# Patient Record
Sex: MALE | Race: AMERICAN INDIAN OR ALASKA NATIVE | NOT HISPANIC OR LATINO | Employment: FULL TIME | URBAN - NONMETROPOLITAN AREA
[De-identification: names, ages, dates, MRNs, and addresses within clinical notes are randomized per-mention and may not be internally consistent; named-entity substitution may affect disease eponyms.]

---

## 2020-08-25 ENCOUNTER — HOSPITAL ENCOUNTER (EMERGENCY)
Facility: OTHER | Age: 25
Discharge: PSYCHIATRIC OR SUBSTANCE USE FACILITY | End: 2020-08-26
Attending: STUDENT IN AN ORGANIZED HEALTH CARE EDUCATION/TRAINING PROGRAM | Admitting: STUDENT IN AN ORGANIZED HEALTH CARE EDUCATION/TRAINING PROGRAM
Payer: COMMERCIAL

## 2020-08-25 DIAGNOSIS — R44.0 AUDITORY HALLUCINATION: ICD-10-CM

## 2020-08-25 DIAGNOSIS — R44.1 VISUAL HALLUCINATION: ICD-10-CM

## 2020-08-25 DIAGNOSIS — R45.851 SUICIDAL IDEATION: ICD-10-CM

## 2020-08-25 DIAGNOSIS — F41.9 ANXIETY: ICD-10-CM

## 2020-08-25 DIAGNOSIS — R07.89 ATYPICAL CHEST PAIN: ICD-10-CM

## 2020-08-25 LAB
BASOPHILS # BLD AUTO: 0.1 10E9/L (ref 0–0.2)
BASOPHILS NFR BLD AUTO: 0.5 %
DIFFERENTIAL METHOD BLD: NORMAL
EOSINOPHIL # BLD AUTO: 0.1 10E9/L (ref 0–0.7)
EOSINOPHIL NFR BLD AUTO: 1.5 %
ERYTHROCYTE [DISTWIDTH] IN BLOOD BY AUTOMATED COUNT: 12.7 % (ref 10–15)
HCT VFR BLD AUTO: 44.8 % (ref 40–53)
HGB BLD-MCNC: 14.9 G/DL (ref 13.3–17.7)
IMM GRANULOCYTES # BLD: 0 10E9/L (ref 0–0.4)
IMM GRANULOCYTES NFR BLD: 0.3 %
LYMPHOCYTES # BLD AUTO: 1.6 10E9/L (ref 0.8–5.3)
LYMPHOCYTES NFR BLD AUTO: 17.9 %
MCH RBC QN AUTO: 29.2 PG (ref 26.5–33)
MCHC RBC AUTO-ENTMCNC: 33.3 G/DL (ref 31.5–36.5)
MCV RBC AUTO: 88 FL (ref 78–100)
MONOCYTES # BLD AUTO: 0.8 10E9/L (ref 0–1.3)
MONOCYTES NFR BLD AUTO: 9.2 %
NEUTROPHILS # BLD AUTO: 6.4 10E9/L (ref 1.6–8.3)
NEUTROPHILS NFR BLD AUTO: 70.6 %
PLATELET # BLD AUTO: 359 10E9/L (ref 150–450)
RBC # BLD AUTO: 5.1 10E12/L (ref 4.4–5.9)
WBC # BLD AUTO: 9.1 10E9/L (ref 4–11)

## 2020-08-25 PROCEDURE — 80048 BASIC METABOLIC PNL TOTAL CA: CPT | Performed by: STUDENT IN AN ORGANIZED HEALTH CARE EDUCATION/TRAINING PROGRAM

## 2020-08-25 PROCEDURE — 81003 URINALYSIS AUTO W/O SCOPE: CPT | Mod: XU | Performed by: STUDENT IN AN ORGANIZED HEALTH CARE EDUCATION/TRAINING PROGRAM

## 2020-08-25 PROCEDURE — 93010 ELECTROCARDIOGRAM REPORT: CPT | Performed by: INTERNAL MEDICINE

## 2020-08-25 PROCEDURE — 84443 ASSAY THYROID STIM HORMONE: CPT | Performed by: STUDENT IN AN ORGANIZED HEALTH CARE EDUCATION/TRAINING PROGRAM

## 2020-08-25 PROCEDURE — 80307 DRUG TEST PRSMV CHEM ANLYZR: CPT | Performed by: STUDENT IN AN ORGANIZED HEALTH CARE EDUCATION/TRAINING PROGRAM

## 2020-08-25 PROCEDURE — 36415 COLL VENOUS BLD VENIPUNCTURE: CPT | Performed by: STUDENT IN AN ORGANIZED HEALTH CARE EDUCATION/TRAINING PROGRAM

## 2020-08-25 PROCEDURE — 84484 ASSAY OF TROPONIN QUANT: CPT | Performed by: STUDENT IN AN ORGANIZED HEALTH CARE EDUCATION/TRAINING PROGRAM

## 2020-08-25 PROCEDURE — C9803 HOPD COVID-19 SPEC COLLECT: HCPCS | Performed by: STUDENT IN AN ORGANIZED HEALTH CARE EDUCATION/TRAINING PROGRAM

## 2020-08-25 PROCEDURE — 99285 EMERGENCY DEPT VISIT HI MDM: CPT | Mod: 25 | Performed by: STUDENT IN AN ORGANIZED HEALTH CARE EDUCATION/TRAINING PROGRAM

## 2020-08-25 PROCEDURE — 99285 EMERGENCY DEPT VISIT HI MDM: CPT | Mod: Z6 | Performed by: STUDENT IN AN ORGANIZED HEALTH CARE EDUCATION/TRAINING PROGRAM

## 2020-08-25 PROCEDURE — 85025 COMPLETE CBC W/AUTO DIFF WBC: CPT | Performed by: STUDENT IN AN ORGANIZED HEALTH CARE EDUCATION/TRAINING PROGRAM

## 2020-08-25 PROCEDURE — 93005 ELECTROCARDIOGRAM TRACING: CPT | Performed by: STUDENT IN AN ORGANIZED HEALTH CARE EDUCATION/TRAINING PROGRAM

## 2020-08-25 ASSESSMENT — MIFFLIN-ST. JEOR: SCORE: 2869.6

## 2020-08-26 ENCOUNTER — TELEPHONE (OUTPATIENT)
Dept: BEHAVIORAL HEALTH | Facility: CLINIC | Age: 25
End: 2020-08-26

## 2020-08-26 ENCOUNTER — HOSPITAL ENCOUNTER (INPATIENT)
Facility: HOSPITAL | Age: 25
LOS: 2 days | Discharge: HOME | End: 2020-08-28
Attending: PSYCHIATRY & NEUROLOGY | Admitting: PSYCHIATRY & NEUROLOGY
Payer: COMMERCIAL

## 2020-08-26 ENCOUNTER — APPOINTMENT (OUTPATIENT)
Dept: GENERAL RADIOLOGY | Facility: OTHER | Age: 25
End: 2020-08-26
Attending: STUDENT IN AN ORGANIZED HEALTH CARE EDUCATION/TRAINING PROGRAM
Payer: COMMERCIAL

## 2020-08-26 VITALS
OXYGEN SATURATION: 99 % | SYSTOLIC BLOOD PRESSURE: 130 MMHG | RESPIRATION RATE: 20 BRPM | WEIGHT: 315 LBS | DIASTOLIC BLOOD PRESSURE: 80 MMHG | BODY MASS INDEX: 36.45 KG/M2 | TEMPERATURE: 97.9 F | HEIGHT: 78 IN | HEART RATE: 73 BPM

## 2020-08-26 DIAGNOSIS — F33.1 MODERATE EPISODE OF RECURRENT MAJOR DEPRESSIVE DISORDER (H): ICD-10-CM

## 2020-08-26 DIAGNOSIS — G47.00 INSOMNIA, UNSPECIFIED TYPE: ICD-10-CM

## 2020-08-26 DIAGNOSIS — R52 PAIN: ICD-10-CM

## 2020-08-26 DIAGNOSIS — Z72.0 TOBACCO ABUSE: Primary | ICD-10-CM

## 2020-08-26 DIAGNOSIS — F32.1 CURRENT MODERATE EPISODE OF MAJOR DEPRESSIVE DISORDER WITHOUT PRIOR EPISODE (H): ICD-10-CM

## 2020-08-26 DIAGNOSIS — F41.9 ANXIETY: ICD-10-CM

## 2020-08-26 PROBLEM — F32.9 MAJOR DEPRESSIVE DISORDER: Status: ACTIVE | Noted: 2020-08-26

## 2020-08-26 LAB
ALBUMIN SERPL-MCNC: 3.3 G/DL (ref 3.4–5)
ALBUMIN UR-MCNC: NEGATIVE MG/DL
ALP SERPL-CCNC: 82 U/L (ref 40–150)
ALT SERPL W P-5'-P-CCNC: 27 U/L (ref 0–70)
AMPHETAMINES UR QL SCN: NOT DETECTED
ANION GAP SERPL CALCULATED.3IONS-SCNC: 7 MMOL/L (ref 3–14)
APPEARANCE UR: CLEAR
AST SERPL W P-5'-P-CCNC: 13 U/L (ref 0–45)
BARBITURATES UR QL: NOT DETECTED
BENZODIAZ UR QL: NOT DETECTED
BILIRUB DIRECT SERPL-MCNC: 0.1 MG/DL (ref 0–0.2)
BILIRUB SERPL-MCNC: 0.6 MG/DL (ref 0.2–1.3)
BILIRUB UR QL STRIP: NEGATIVE
BUN SERPL-MCNC: 10 MG/DL (ref 7–25)
BUPRENORPHINE UR QL: NOT DETECTED NG/ML
CALCIUM SERPL-MCNC: 8.8 MG/DL (ref 8.6–10.3)
CANNABINOIDS UR QL: NOT DETECTED NG/ML
CHLORIDE SERPL-SCNC: 103 MMOL/L (ref 98–107)
CO2 SERPL-SCNC: 28 MMOL/L (ref 21–31)
COCAINE UR QL: NOT DETECTED
COLOR UR AUTO: NORMAL
CREAT SERPL-MCNC: 0.57 MG/DL (ref 0.7–1.3)
D-METHAMPHET UR QL: NOT DETECTED NG/ML
GFR SERPL CREATININE-BSD FRML MDRD: >90 ML/MIN/{1.73_M2}
GGT SERPL-CCNC: 45 U/L (ref 0–75)
GLUCOSE SERPL-MCNC: 107 MG/DL (ref 70–105)
GLUCOSE UR STRIP-MCNC: NEGATIVE MG/DL
HGB UR QL STRIP: NEGATIVE
KETONES UR STRIP-MCNC: NEGATIVE MG/DL
LABORATORY COMMENT REPORT: NORMAL
LEUKOCYTE ESTERASE UR QL STRIP: NEGATIVE
METHADONE UR QL SCN: NOT DETECTED
NITRATE UR QL: NEGATIVE
OPIATES UR QL SCN: NOT DETECTED
OXYCODONE UR QL: NOT DETECTED NG/ML
PCP UR QL SCN: NOT DETECTED
PH UR STRIP: 6.5 PH (ref 5–7)
POTASSIUM SERPL-SCNC: 3.6 MMOL/L (ref 3.5–5.1)
PROPOXYPH UR QL: NOT DETECTED NG/ML
PROT SERPL-MCNC: 6.9 G/DL (ref 6.8–8.8)
SARS-COV-2 RNA SPEC QL NAA+PROBE: NEGATIVE
SARS-COV-2 RNA SPEC QL NAA+PROBE: NORMAL
SODIUM SERPL-SCNC: 138 MMOL/L (ref 134–144)
SOURCE: NORMAL
SP GR UR STRIP: 1.02 (ref 1–1.03)
SPECIMEN SOURCE: NORMAL
SPECIMEN SOURCE: NORMAL
TRICYCLICS UR QL SCN: NOT DETECTED NG/ML
TROPONIN I SERPL-MCNC: <2.3 PG/ML
TSH SERPL DL<=0.05 MIU/L-ACNC: 3.19 IU/ML (ref 0.34–5.6)
UROBILINOGEN UR STRIP-MCNC: NORMAL MG/DL (ref 0–2)

## 2020-08-26 PROCEDURE — 12400000 ZZH R&B MH

## 2020-08-26 PROCEDURE — 99223 1ST HOSP IP/OBS HIGH 75: CPT | Performed by: NURSE PRACTITIONER

## 2020-08-26 PROCEDURE — 87635 SARS-COV-2 COVID-19 AMP PRB: CPT | Performed by: STUDENT IN AN ORGANIZED HEALTH CARE EDUCATION/TRAINING PROGRAM

## 2020-08-26 PROCEDURE — 71045 X-RAY EXAM CHEST 1 VIEW: CPT

## 2020-08-26 PROCEDURE — 80076 HEPATIC FUNCTION PANEL: CPT | Performed by: NURSE PRACTITIONER

## 2020-08-26 PROCEDURE — 36415 COLL VENOUS BLD VENIPUNCTURE: CPT | Performed by: NURSE PRACTITIONER

## 2020-08-26 PROCEDURE — 82977 ASSAY OF GGT: CPT | Performed by: NURSE PRACTITIONER

## 2020-08-26 RX ORDER — NICOTINE 21 MG/24HR
1 PATCH, TRANSDERMAL 24 HOURS TRANSDERMAL DAILY
Status: DISCONTINUED | OUTPATIENT
Start: 2020-08-26 | End: 2020-08-28 | Stop reason: HOSPADM

## 2020-08-26 RX ORDER — SERTRALINE HYDROCHLORIDE 25 MG/1
25 TABLET, FILM COATED ORAL DAILY
Status: ON HOLD | COMMUNITY
Start: 2020-06-15 | End: 2020-08-28

## 2020-08-26 RX ORDER — TRAZODONE HYDROCHLORIDE 50 MG/1
50 TABLET, FILM COATED ORAL
Status: DISCONTINUED | OUTPATIENT
Start: 2020-08-26 | End: 2020-08-28 | Stop reason: HOSPADM

## 2020-08-26 RX ORDER — HYDROXYZINE HYDROCHLORIDE 10 MG/1
30-50 TABLET, FILM COATED ORAL EVERY 4 HOURS PRN
Status: DISCONTINUED | OUTPATIENT
Start: 2020-08-26 | End: 2020-08-28 | Stop reason: HOSPADM

## 2020-08-26 RX ORDER — ACETAMINOPHEN 325 MG/1
650 TABLET ORAL EVERY 4 HOURS PRN
Status: DISCONTINUED | OUTPATIENT
Start: 2020-08-26 | End: 2020-08-28 | Stop reason: HOSPADM

## 2020-08-26 ASSESSMENT — ACTIVITIES OF DAILY LIVING (ADL)
DRESS: SCRUBS (BEHAVIORAL HEALTH);INDEPENDENT
FALL_HISTORY_WITHIN_LAST_SIX_MONTHS: NO
HYGIENE/GROOMING: INDEPENDENT;PROMPTS
TOILETING: 0-->INDEPENDENT
DRESS: 0-->INDEPENDENT
LAUNDRY: UNABLE TO COMPLETE
RETIRED_COMMUNICATION: 0-->UNDERSTANDS/COMMUNICATES WITHOUT DIFFICULTY
BATHING: 0-->INDEPENDENT
AMBULATION: 0-->INDEPENDENT
COGNITION: 0 - NO COGNITION ISSUES REPORTED
TRANSFERRING: 0-->INDEPENDENT
SWALLOWING: 0-->SWALLOWS FOODS/LIQUIDS WITHOUT DIFFICULTY
ORAL_HYGIENE: INDEPENDENT;PROMPTS
RETIRED_EATING: 0-->INDEPENDENT

## 2020-08-26 ASSESSMENT — MIFFLIN-ST. JEOR: SCORE: 2828.32

## 2020-08-26 NOTE — PLAN OF CARE
"ADMISSION NOTE    Reason for admission SI.  Safety concerns Risk for self harm.  Risk for or history of violence none   Full skin assessment: Completed with no areas of concern.    Patient arrived on unit from [facility] accompanied by Security and  staff on 8/26/2020  0421 AM.   Status on arrival: Pleasant, calm and cooperative with staff.   /58   Pulse 70   Temp 98.8  F (37.1  C) (Tympanic)   Resp 18   Ht 1.93 m (6' 4\")   Wt (!) 173.7 kg (382 lb 14.4 oz)   SpO2 97%   BMI 46.61 kg/m    Patient given tour of unit and Welcome to  unit papers given to patient, wanding completed, belongings inventoried, and admission assessment completed.   Patient's legal status on arrival is Voluntary. Appropriate legal rights discussed with and copy given to patient. Patient Bill of Rights discussed with and copy given to patient.   Patient denies SI, HI, and thoughts of self harm and contracts for safety while on unit.      Flor Mabry RN  8/26/2020  5:46 AM      Pt arrived on unit, is pleasant and cooperative with staff.  Appears down, flat affect.  Stated he works at the [employer] near [place] and he likes it there.  He recently moved out on his own and states that he is very lonely, trying to make ends meet and he feels this has been the major contributory factor in his depression and anxiety.  Pt stated he has had past SA, did not elaborate stated it was when he was under age 19 except for the last attempt was approx. 3 months ago.  Pt denied pain or discomfort.  Did contract for safety while on the unit.      Face to face report will be communicated to oncoming RN.    Flor Mabry RN  8/26/2020  6:11 AM         Problem: Behavioral Health Plan of Care  Goal: Patient-Specific Goal (Individualization)  Description: Pt will attend at least 50% of groups.  Pt will sleep at least 6-8 hours per night.  Pt will be compliant with ordered medications and treatment team recommendations.    Outcome: No Change     "

## 2020-08-26 NOTE — PLAN OF CARE
The only med I could find was a sertraline 25 mg, increased to 50 mg after one week. I called the pharmacy it was sent to and it was never picked up, so it is likely he never started it. (Sent to [facility] [place] 6/15/20)    Let me know if there is anything else to look into and I will do so, otherwise this was the only maintenance medication I could find for this patient.     Zainab Sheets on 8/26/2020 at 3:44 PM

## 2020-08-26 NOTE — ED NOTES
Pt aware of order for DEC assessment to be preformed. Pt verbalized understanding of information.  Pt agrees to DEC assessment.

## 2020-08-26 NOTE — TELEPHONE ENCOUNTER
S:  CARLOS Peña called at 12:45a with 25y Male in [facility] ED with suicidal thoughts, anxiety, auditory and visual hallucinations.    B:  Pt reports increased anxiety and frequent suicidal thoughts without a current plan, but in past has thought of using knives. Pt states he has had 3 close attempts of suicide with most recent 3 months ago, but family support is what keeps him from carrying out his plan.  Pt reports his anxiety is worsening, and hears noises and sometimes his families voices.  Additionally, pt reports seeing black lines and shadows.  Pt reports he lives alone and works part time at the [employer] in [place] and feels he cannot keep self safe. Pt self reports anxiety and SI are worse day after drinking.   Pt states he did drink yesterday, but no alcohol today.  Pt has gone to [facility] in the past for care but does not like how they treat him there so came to [facility] where he feels people are nicer and more helpful.      Pt is very tearful in the ED.   Pt has no hx of MH and no providers.   UTOX clear, Covid test in pending.    A:  Vol    R:  Patient cleared and ready for behavioral bed placement: Yes   Provider paged for EMANUEL OCHOA/Zain at 1:14am & 1:49am.   Contacted provider at 1:49am and Presented for Zain/HI 5N and provider accepted.  Pt in que at 1:52a and charge nurse 5N called and given disposition.  [facility] ED Updated @ 1:55am

## 2020-08-26 NOTE — ED NOTES
Pt belongings removed from locker #1 to be sent with Pt on transfer. Pt did leave his keys in lock up with security for his mom to come and get them so she can care for his car and house.

## 2020-08-26 NOTE — PLAN OF CARE
"  Problem: Behavioral Health Plan of Care  Goal: Patient-Specific Goal (Individualization)  Description: Pt will attend at least 50% of groups.  Pt will sleep at least 6-8 hours per night.  Pt will be compliant with ordered medications and treatment team recommendations.    8/26/2020 1647 by Kasandra Huerta, RN  Outcome: No Change     End of shift report received form previous shift RN. Pt observed, in bed sleeping. At shift change, pt received phone call from family members wanting to talk with pt. Pt informed of this. Pt calm, cooperative, blunted/flat affect. \"i'm alright\" when asked about anxiety, depression. Denies SI.  1830- Encouraged to shower, wean out of MH-ICU, attend group pt responds by asking to leave and continues to lay in bed. Explained 12 hour intent form and process for leaving. Pt verbalized understanding and still wants to sign form.  1850- Brought form to pt, pt to wait until tomorrow to talk with the provider. Form not signed.    1945- Weans out of MH-ICU to take phone call. Remains common areas throughout snack time.  Returns to room to shower and comes back to open unit. Pleasant, calm, cooperative.   Face to face end of shift report communicated to oncoming RN.     Kasandra Huerta RN  8/26/2020  10:19 PM     PTA medications- Pt declines ever taking sertraline.     Problem: Suicide Risk  Goal: Absence of Self-Harm  Description: Pt will be free from self harm during hospital stay.  Pt will verbalize decrease depression and suicide Ideation by discharge.  Pt will verbalize suicide prevention plan by discharge.  8/26/2020 1647 by Kasandra Huerta, RN  Outcome: No Change   Denies SI. Demonstrates safe behaviors on unit.   "

## 2020-08-26 NOTE — LETTER
[facility]  [address] [place] [id number]  Phone: [phone]  Fax: [phone]    08/29/20    Ant JOEL Headbird  [address] [facility] [address]      To whom it may concern:     Ant was in my care at the hospital on 8/25/20-8/28/20. He may return to work at any time.     Sincerely,      Lynne Winston, CNP

## 2020-08-26 NOTE — PLAN OF CARE
1257: Pt report received from Riccardo WALL RN on 5 North.   1300: Pt arrived on 5 South at this time. Pt is calm and cooperative upon arrivial onto unit.     Pt in bed rest of this shift, encouraged pt to wean into open unit and attend groups. This writer and SW in to assess pt this afternoon. Pt denies SI this shift, states thoughts have improved since admission. Pt does not appear to be responding to internal stimuli. Pt reports no requests and/or concerns this shift.

## 2020-08-26 NOTE — ED TRIAGE NOTES
"Pt c/o chest pain with \"hard to breathe\", pt appears to be anxious. Pt states this has been going on \"for a long time\". Pt to bay 04 for EKG and monitoring  "

## 2020-08-26 NOTE — PROGRESS NOTES
This RN called and gave report to Hendersonbailee Alvarado. DOC Alvarado denies need for further information at this time.

## 2020-08-26 NOTE — ED NOTES
This RN Called [facility] for transport of Pt from the ER here at [facility] to  [facility] in [place] where Pt is to be admitted to [address] [place] [address] by accepting MD Washburn.  [facility] reported they will call us back with transport information.

## 2020-08-26 NOTE — H&P
"Psychiatric Eval/H&P    Patient Name: Ant Tejeda   YOB: 1995  Age: 25 year old  [id number]    Primary Physician: No Ref-Primary, Physician   Completed by EDGARDO Granad   none  ARHMS none   primary psychiatrist/NP none  Therapist none  Family contact: mother: julita          CC: \"sheree been seeing and hearing things for two years at night\"    HPI   Ant Tejeda is a 25 year old  male who brought him self to the [place] emergency room with severe anxiety, chest pain and suicidal ideation after presenting to the [place] emergency room the same day and being discharged home.  Reports racing heart shortness of breath hallucinations and difficulty sleeping.    Started having auditory and visual hallucinations 6 months ago seen shadows of the lines and also hearing noises including feeling like he hears his family's voices.         He reported that he has come close to attempting suicide 3 times in the past few months the last attempt was 3 months ago.  He has brought himself to the [place] emergency room multiple times in the past to attempt to get help therefore brought himself to the [facility] emergency room.     drug screen was negative. Last drink was 2 days ago.      States that he only has hallucinations \"right when I fall asleep\". Denies they ever happen during the day. \"I see lines out of the corner of my eye.\" never has had command hallucinations. He states at night he will hear his mothers voices yelling his name or screaming. His affect is quite flat though he doesn't apear preoccupied. He denies having trauma as a child though this seems questionable as his mother was a single parent raising 4 children with no father around. He states he was often alone due to her having to work frequently.     He states he has never actually attempted to kill himself though \"I went for a knife a few months ago and my family got it away from me\".  He states he does not " "have a history of any self-injurious behavior such as cutting.  He reports that these hallucinations only occur \"just as I am about to fall asleep\" he does not have any sleep paralysis.  He denies having nightmares.  He is unsure if he snores at night.  He has never had a sleep study.     Past Psychiatric History:     Was on zoloft or prozac as a child which he stated was helpful and did not make symptoms worse.      Has never has never been on the inpatient psychiatric unit.  Does not see a psychiatrist or therapist and has no outpatient services.     Social History:       Does not know his biological father well. Grew up with mother. Is middle of 5 children. Did not graduate HS or get his GED.   Lives alone in an apartment in [place].  Works full-time at the [place] [employer]      Chemical Use History:     Denies ever having to go to chemical dependency treatment     States he drinks alcohol twice a week. \"when im not working I used to drink daily\". Drinks until blacking out.     Family Psychiatric History: 2 sisters with schizophrenia.       Medical History and ROS    Prior to Admission medications    Not on File     No Known Allergies  No past medical history on file.  No past surgical history on file.    Current Medications none    Past Psychiatric Medications Tried zoloft or prozac for short time with some benefit of depression with no symptom increase    Physical Exam/ROS:     Please refer to the physical exam completed on by Dr. Vail on 8/25/20. No Further issues of concern noted.    He states that he has been off of any diabetic medications for approximately 1 year and that his blood sugars have normalized after weight loss.  I will order hemoglobin A1c and post hospitalist if needed           MSE/PSYCH  PSYCHIATRIC EXAM  /68   Pulse 69   Temp 97.6  F (36.4  C) (Tympanic)   Resp 18   Ht 1.93 m (6' 4\")   Wt (!) 173.7 kg (382 lb 14.4 oz)   SpO2 98%   BMI 46.61 kg/m    -Appearance/Behavior: " "well groomed.    -Motor: intact  -Gait: intact  -Abnormal involuntary movements:none-  Mood: dysphoric  -Affect: sad  -Speech: slow, monotone, doesn't appear to initate much conversation         -Thought process/associations: intact.   -Thought content: denies current AH/VH. Has suicidasl thoughts  -Perceptual disturbances: hallucinations only while falling asleep        -Suicidal/Homicidal Ideation: presrent with no current plan  -Judgment: Poor.  -Insight: Fair.  *Orientation: time, place and person.  *Memory: intact  *Attention: intact  *Language: fluent, no aphasias, able to repeat phrases and name objects. Vocab intact.  *Fund of information: average  *Cognitive functioning estimate: 0 - independent.     Labs:     Drug screen was negative  CBC was unremarkable  BMP unremarkable  TSH was 3.19     Assessment/Impression: This is a 25 year old yo male with an increase in depression over the past 6 months.  For the past 2 years he has been having hallucinations both visual and auditory just before he falls asleep.  He does not have them at any other time of the day.  He states \"this all started when I was diagnosed with diabetes\" he does believe that the diagnosis of diabetes was traumatic and very anxiety provoking and prior to this was already diagnosed with depression as a child.  I also question more of a trauma history than he acknowledges.  He drinks quite heavily though over the years has cut back a bit to 2 days/week though does appear to be open to exploring treatment options.  It is concerning that he has 2 sisters that have been diagnosed with schizophrenia though currently his hallucinations only occur while he is about to fall asleep and he has never had them at any other times during the day.  He is not disorganized and does not have any other significant symptoms of schizophrenia other than some negativism which could be secondary to depression.  Due to his history of significant type 2 diabetes with " a hemoglobin A1c of 12, I am not going to start him on neuroleptic at this time and try to treat anxiety and depression as it could be the cause of hypnagogic hallucinations which a neuroleptic would not be required or recommended.           Educated regarding medication indications, risks, benefits, side effects, contraindications and possible interactions. Verbally expressed understanding.     DX:       Mdd, reccurrent, severe  R/o hypnogogic hallucinations vs schizophreniform d/o  Alcohol use disorder, likely moderate   Type 2 diabetes        Plan:     Labs Needed:    Will obtain LFTs.  He is a history of elevated AST and ALT in 2018 and 2019  Will order ggt with lfts   hgb a1c.     Med Changes:    Start Prozac 20 mg daily  Start Minipress 2 mg nightly   Alcohol withdrawal protocol is in place  Will speak with him about naltrexone tomorrow.      DC Planning:    Rule 25.   Back to own apartment with services    Anticipated length of stay 4 days

## 2020-08-26 NOTE — PLAN OF CARE
Face to Face report received from DOC Ray.  Rounding completed. Patient observed in room.   Problem: Behavioral Health Plan of Care  Goal: Patient-Specific Goal (Individualization)  Description: Pt will attend at least 50% of groups.  Pt will sleep at least 6-8 hours per night.  Pt will be compliant with ordered medications and treatment team recommendations.    8/26/2020 0745 by Riccardo Calix RN  Outcome: Improving  He did not eat breakfast. He is feeling tired today. He talks of having anxiety and depression. He talks of having hallucinations when he is trying to sleep. He talks of binge drinking every day that he is not at work. He doesn't see that his drinking is a problem but does admit to a family history of alcoholism.     Problem: Suicide Risk  Goal: Absence of Self-Harm  Description: Pt will be free from self harm during hospital stay.  Pt will verbalize decrease depression and suicide Ideation by discharge.  Pt will verbalize suicide prevention plan by discharge.  Outcome: Improving  He is agreeable to safe behavior on the unit. He admits to having suicidal thoughts.   1457: report given to Melyssa MCDREMOTT RN on 5 south. Patient transferred at this time.

## 2020-08-26 NOTE — ED NOTES
EMS Meds 1 arrived for transport.  This RN gave report to EMS crew. EMS denies need for further information and or questions at this time.  Pt A & O x 4.  Pt now under 1:1 monitoring by EMS crew.  Pt denies chest pain and or shortness of breath. Receiving facility  updated on Pt departure from ER at this time.

## 2020-08-26 NOTE — PROGRESS NOTES
08/26/20 9714   Patient Belongings   Did you bring any home meds/supplements to the hospital?  No   Patient Belongings remains with patient  (boxer tshirt pants molly burleson  mask)   Patient Belongings Remaining with Patient cell phone/electronics   Belongings Search Yes   Clothing Search Yes   Second Staff jose daniel   Comment n/a   List items sent to [employer] with otterbox case I buds iwatch   All other belongings put in assigned cubby in belongings room.       I have reviewed my belongings list on admission and verify that it is correct.     Patient signature_______________________________    Second staff witness (if patient unable to sign) ______________________________       I have received all my belongings at discharge.    Patient signature________________________________    Maria E

## 2020-08-26 NOTE — PLAN OF CARE
Social Service Psychosocial Assessment  Presenting Problem:   Ant Tejeda is a 25 year old  male who brought him self to the [facility] emergency room with severe anxiety, chest pain and suicidal ideation after presenting to the [facility] emergency room the same day and being discharged home.  Reports racing heart shortness of breath hallucinations and difficulty sleeping.   Marital Status:   Single   Spouse / Children:    Single / no kids   Psychiatric TX HX:  Denies past MH hospitalizations   Suicide Risk Assessment: Patient admits to SI for Admit, and past SI, Denies SI today  Access to Lethal Means (explain):  Denies acess to lethal means   Family Psych HX:  Patient stated his sister has Schizophrenia   A & Ox:  X3  Medication Adherence:  See H&P  Medical Issues:  See H&P  Visual -Motor Functioning:   Good, no issues noticed  Communication Skills /Needs:   Good, No issues noticed   Ethnicity:    or      Spirituality/Religious Affiliation:   No   Clergy Request:   No   History:  No  Living Situation:  Lives in [place]   ADL s:  Independently   Education:10th Grade did not graduate.   Financial Situation: Supervisor at [employer]   Occupation:  Gambling   Leisure & Recreation:  Video games, friends, family  Childhood History:   Good grew up with mom  Trauma Abuse HX:   Denies   Relationship / Sexuality:   None  Substance Use/ Abuse:   Alcohol, denies it is an issue  Chemical Dependency Treatment HX:  Denies   Legal Issues: none   Significant Life Events:  Getting hired at the [employer]   Strengths:  Has a job, has a place to live, has a support of family, is open to services  Challenges /Limitation:  Lack of services, Current MH and SI    Patient Support Contact (Include name, relationship, number, and summary of conversation):  NATIVIDAD signed for mom  Interventions:        Community-Based Programs Needs     Medical/Dental Care Needs     CD Evaluation/Rule  25/Aftercare    Medication Management Would like setup    Individual Therapy interested     Insurance Coverage COMMERCIAL/IMCARE PMAP     Suicide Risk Assessment - Patient admits to SI for Admit, and past SI, Denies SI today    High Risk Safety Plan Talk to supports; Call crisis lines; Go to local ER if feeling suicidal.  NAOMI Mabry  8/26/2020  11:35 AM

## 2020-08-26 NOTE — ED PROVIDER NOTES
"Patient:  Ant Tejeda  MRN: [id number] : 1995  Date of Service:  20      Subjective:  HPI:  Ant Tejeda is a 25 year old male with unknown PMH presenting to the ED with cc of sensation of chest pain as well as a feeling of severe anxiousness.  Patient reports waking up around 10 PM this evening feeling a sensation of chest pressure and burning in his upper chest that does not radiate, is not pleuritic, is not associated with shortness of breath, nausea, or diaphoresis.  He reports having similar pain in the past 1 day after drinking alcohol.  He does report alcohol use yesterday but denies alcohol or substance abuse today.  Regarding his anxiety, he states that he feels stressed and anxious quite often and admits that he is feeling suicidal as well.  He reports not having a plan but does state that he has had thoughts of hurting himself with knives in the past.  He denies any attempts at self-harm today as well as ingestions of medications over-the-counter or prescribed or off the street.  He denies abdominal pain, back pain, headache, blurry vision, numbness or tingling.      ROS:  10 point ROS is negative except as listed above in the HPI.    PMH/PSH:   No past medical history on file.  No past surgical history on file.    FamHx: No family history on file.    SOC:    Relationships   Social connections     Talks on phone: Not on file     Gets together: Not on file     Attends Gnosticism service: Not on file     Active member of club or organization: Not on file     Attends meetings of clubs or organizations: Not on file     Relationship status: Not on file       ALL:  No Known Allergies    Meds:   No current facility-administered medications for this encounter.      No current outpatient medications on file.         Objective:  VS:   BP (!) 174/113   Pulse 111   Resp 18   Ht 1.981 m (6' 6\")   Wt (!) 174.6 kg (385 lb)   SpO2 98%   BMI 44.49 kg/m       Physical Exam:     Gen:  Alert, " nontoxic    HEENT:  Normocephalic, PERRLA, no scleral icterus.     Neck:  Trachea midline, supple   Lungs:  Normal WOB. No distress.   Cardio:  Regular, Pulses intact.   Abd:  Soft, obese, nontender, no rebound or guarding.   Ext:  No peripheral edema.  No obvious trauma.   Neuro:  A&Ox4, CN II-XII grossly intact.    MSK: no obvious areas of cellulitis.  Normal ROM of major joints.     Skin: warm, dry, no obvious rash.   Psych: Extremely anxious and tearful in the room.  Reporting passive thoughts of suicidality but no active plan. No homicidal ideations. Reports previous, intermittent vague visual and auditory hallucinations which she does not elaborate on but none at present.      Medical Decision Making:  This extremely anxious and tearful 25-year-old gentleman presents for evaluation of chest pain, anxiousness, and suicidal ideation.  He arrives mildly hypertensive in the 170s over 110s, mildly tachycardic when provoked or tearful, but satting well on room air and in no acute distress.  His exam is largely reassuring as above other than for his psychiatric exam.  He describes his chest pain is more of a burning sensation but he appears to be a poor historian and I am unable to verify any history in the chart.  Will perform work-up for rule out of ACS while completing psychiatric assessment.  At this time, the patient does not appear to be danger to himself and would not be placed on a hold as he does not have a plan and has not attempted to hurt himself this evening which I tend to believe.    Differential diagnosis for his chest pain includes ACS, unstable angina, pericarditis, pneumothorax, GERD.    ED Course as of Aug 26 0118   Wed Aug 26, 2020   0111 Troponin: <2.3   0111 TSH Reflex: 3.19   0111 Sodium: 138   0111 Potassium: 3.6   0111 Chloride: 103   0111 Glucose(!): 107   0111 Urea Nitrogen: 10   0111 Creatinine(!): 0.57   0111 Calcium: 8.8   0111 Amphetamine Qual Urine: Not Detected   0111 Benzodiazepine  Qual Urine: Not Detected   0111 Cocaine Qual Urine: Not Detected   0111 Methadone Qual Urine: Not Detected   0111 Pcp Qual Urine: Not Detected   0111 Opiates Qualitative Urine: Not Detected   0111 Oxycodone Qualitative Urine: Not Detected   0111 Propoxyphene Qualitative Urine: Not Detected   0111 Tricyclic Antidepressants Qual Urine: Not Detected   0111 Barbiturates Qual Urine: Not Detected   0111 Methamphetamine Qualitative Urine: Not Detected   0111 Cannabinoids Qualitative Urine: Not Detected   0111 Buprenorphine Qualitative Urine: Not Detected   0111 WBC: 9.1   0112 Hematocrit: 44.8   0112 Platelet Count: 359   0112 Nitrite Urine: Negative   0112 Leukocyte Esterase Urine: Negative   0112 Bilirubin Urine: Negative   0112 Ketones Urine: Negative   0112 Blood Urine: Negative   0112 Negative per my wet read although under penetrated film.   XR Chest Port 1 View     Workup negative as above. Sx improved with rest and time.  I feel that ACS versus underlying pulmonary or aortic pathology is extremely unlikely in this scenario with the above history, context, and workup. Additionally, pt is low risk per EDACS and HEART scores. No historical features or risk factors for PE.    After patient's discussion with tele-psych, they are under the impression that the patient may be an acute danger to himself and is reporting nearly daily suicidal ideation as well as hearing noises and family member voices and seeing shadows for the past 6 months.  They were unable to convincingly arrive at a contract for safety with the patient.  Given the history and negative work-up as above, I feel the patient is medically cleared for psychiatric hospitalization to which the patient, myself, and tele-psych agree.  I feel this may be the best option as well given that the patient does not appear to have ever had a psychiatric evaluation to the best of my knowledge.    Final Clinical Impression:  (R44.0) Auditory hallucination    (R44.1) Visual  hallucination    (R45.851) Suicidal ideation    (R07.89) Atypical chest pain    (F41.9) Anxiety      PROCEDURES:  None    Randolph Vail MD  8/25/2020  11:32 PM  Emergency Medicine Physician

## 2020-08-27 VITALS
RESPIRATION RATE: 16 BRPM | OXYGEN SATURATION: 98 % | DIASTOLIC BLOOD PRESSURE: 60 MMHG | SYSTOLIC BLOOD PRESSURE: 130 MMHG | HEIGHT: 76 IN | WEIGHT: 315 LBS | HEART RATE: 56 BPM | TEMPERATURE: 98.1 F | BODY MASS INDEX: 38.36 KG/M2

## 2020-08-27 LAB
ALBUMIN SERPL-MCNC: 3.1 G/DL (ref 3.4–5)
ALP SERPL-CCNC: 80 U/L (ref 40–150)
ALT SERPL W P-5'-P-CCNC: 34 U/L (ref 0–70)
AST SERPL W P-5'-P-CCNC: 21 U/L (ref 0–45)
BILIRUB DIRECT SERPL-MCNC: <0.1 MG/DL (ref 0–0.2)
BILIRUB SERPL-MCNC: 0.3 MG/DL (ref 0.2–1.3)
EST. AVERAGE GLUCOSE BLD GHB EST-MCNC: 114 MG/DL
HBA1C MFR BLD: 5.6 % (ref 0–5.6)
PROT SERPL-MCNC: 7 G/DL (ref 6.8–8.8)

## 2020-08-27 PROCEDURE — 83036 HEMOGLOBIN GLYCOSYLATED A1C: CPT | Performed by: NURSE PRACTITIONER

## 2020-08-27 PROCEDURE — 25000132 ZZH RX MED GY IP 250 OP 250 PS 637: Performed by: NURSE PRACTITIONER

## 2020-08-27 PROCEDURE — 99233 SBSQ HOSP IP/OBS HIGH 50: CPT | Performed by: NURSE PRACTITIONER

## 2020-08-27 PROCEDURE — 36415 COLL VENOUS BLD VENIPUNCTURE: CPT | Performed by: NURSE PRACTITIONER

## 2020-08-27 PROCEDURE — 80076 HEPATIC FUNCTION PANEL: CPT | Performed by: NURSE PRACTITIONER

## 2020-08-27 PROCEDURE — 40000788 ZZHCL STATISTIC ESTIMATED AVERAGE GLUCOSE: Performed by: NURSE PRACTITIONER

## 2020-08-27 PROCEDURE — 12400000 ZZH R&B MH

## 2020-08-27 RX ORDER — PRAZOSIN HYDROCHLORIDE 1 MG/1
1 CAPSULE ORAL AT BEDTIME
Status: DISCONTINUED | OUTPATIENT
Start: 2020-08-27 | End: 2020-08-28 | Stop reason: HOSPADM

## 2020-08-27 RX ORDER — NALTREXONE HYDROCHLORIDE 50 MG/1
50 TABLET, FILM COATED ORAL DAILY
Status: DISCONTINUED | OUTPATIENT
Start: 2020-08-27 | End: 2020-08-28 | Stop reason: HOSPADM

## 2020-08-27 RX ADMIN — NALTREXONE HYDROCHLORIDE 50 MG: 50 TABLET, FILM COATED ORAL at 13:36

## 2020-08-27 RX ADMIN — FLUOXETINE 20 MG: 20 CAPSULE ORAL at 08:17

## 2020-08-27 RX ADMIN — PRAZOSIN HYDROCHLORIDE 1 MG: 1 CAPSULE ORAL at 20:53

## 2020-08-27 ASSESSMENT — ACTIVITIES OF DAILY LIVING (ADL)
ORAL_HYGIENE: INDEPENDENT
HYGIENE/GROOMING: INDEPENDENT
DRESS: SCRUBS (BEHAVIORAL HEALTH);INDEPENDENT
LAUNDRY: UNABLE TO COMPLETE
HYGIENE/GROOMING: INDEPENDENT

## 2020-08-27 NOTE — PLAN OF CARE
"Problem: Behavioral Health Plan of Care  Goal: Patient-Specific Goal (Individualization)  Description: Pt will attend at least 50% of groups.  Pt will sleep at least 6-8 hours per night.  Pt will be compliant with ordered medications and treatment team recommendations.    Pt able to wean at will, [facility].     Pt resting in bed at start of shift. According to charting, slept approx. 6.5 hours last night. Ate 100% of breakfast. Compliant with medication and assessment. States he slept well and \"feels a lot better\". Denies SI, anxiety, depression this morning. Denies pain. Withdrawn. Did wean this morning for breakfast and much of the morning. Up reading magazines and conversing with peers in the lounge. Encouraged to attend groups today. [facility]. Will continue to monitor.    1405 Bed resting. No complaints at this time.    Outcome: Improving    Problem: Suicide Risk  Goal: Absence of Self-Harm  Description: Pt will be free from self harm during hospital stay.  Pt will verbalize decrease depression and suicide Ideation by discharge.  Pt will verbalize suicide prevention plan by discharge.    Denies SI. Has remained free from self harm.    Outcome: Improving    Face to face end of shift report communicated to oncoming RN.     8/27/2020     "

## 2020-08-27 NOTE — PLAN OF CARE
"  Problem: Behavioral Health Plan of Care  Goal: Patient-Specific Goal (Individualization)  Description: Pt will attend at least 50% of groups.  Pt will sleep at least 6-8 hours per night.  Pt will be compliant with ordered medications and treatment team recommendations.  8/27/2020 1631 by Kasandra Huerta RN  Outcome: No Change    End of shift report received from previous shift RN. Pt observed, in group. Tidy, well groomed. Social with peers. Attends groups. Affect brighter than previous shift. Denies SI, anxiety. States depression is \"doing good\". Education handouts given for new medications pt started today with opportunity to ask questions. Pt takes medications as prescribed.    Face to face end of shift report communicated to oncoming RN.     Kasandra Huerta RN  8/27/2020  10:12 PM    Problem: Suicide Risk  Goal: Absence of Self-Harm  Description: Pt will be free from self harm during hospital stay.  Pt will verbalize decrease depression and suicide Ideation by discharge.  Pt will verbalize suicide prevention plan by discharge.  8/27/2020 1631 by Kasandra Huerta, RN  Outcome: No Change   Demonstrates safe behaviors. Denies SI  "

## 2020-08-27 NOTE — PLAN OF CARE
Problem: Behavioral Health Plan of Care  Goal: Patient-Specific Goal (Individualization)  Description: Pt will attend at least 50% of groups.  Pt will sleep at least 6-8 hours per night.  Pt will be compliant with ordered medications and treatment team recommendations.    8/27/2020 0020 by Airam Mir, RN  Outcome: No Change  Note:        Problem: Suicide Risk  Goal: Absence of Self-Harm  Description: Pt will be free from self harm during hospital stay.  Pt will verbalize decrease depression and suicide Ideation by discharge.  Pt will verbalize suicide prevention plan by discharge.  8/27/2020 0020 by Airam Mir, RN  Outcome: No Change     Face to face shift report received from Kasandra ROACH. Rounding completed, pt observed.     Pt appeared to be sleeping most of this shift. Pt did not have any noted episodes of self harm or injury this shift.    Face to face report will be communicated to [employer] ROACH.    Airam Mir RN  8/27/2020  6:21 AM

## 2020-08-27 NOTE — PROGRESS NOTES
"[facility]  Psychiatric Progress Note      Impression:     He tells me that he slept well and that he feels much better though its concerning that he has gone into the [place] [facility] for help on a few different occasions and has been unstable for some time. He seems a bit brighter than yesterday. He states \"I feel better. I think I was just exhausted\". He denies hallucinations and denied that he had any last night. He doesn't appear preoccupied. He states he spoke with his mother yesterday and \"she is having my sister come and stay with me for a while\".     Educated regarding medication indications, risks, benefits, side effects, contraindications and possible interactions. Verbally expressed understanding.        Diagnoses:     Mdd, reccurrent, severe  R/o hypnogogic hallucinations vs schizophreniform d/o  Alcohol use disorder, likely moderate   Type 2 diabetes        Attestation:  Patient has been seen and evaluated by me,  Lynne Winston NP          Interim History:   The patient's care was discussed with the treatment team and chart notes were reviewed.            Medications:     Current Facility-Administered Medications Ordered in Epic   Medication Dose Route Frequency Last Rate Last Dose     acetaminophen (TYLENOL) tablet 650 mg  650 mg Oral Q4H PRN         FLUoxetine (PROzac) capsule 20 mg  20 mg Oral Daily   20 mg at 08/27/20 0817     hydrOXYzine (ATARAX) tablet 30-50 mg  30-50 mg Oral Q4H PRN         nicotine (NICODERM CQ) 14 MG/24HR 24 hr patch 1 patch  1 patch Transdermal Daily         nicotine Patch in Place   Transdermal Q8H         prazosin (MINIPRESS) capsule 1 mg  1 mg Oral At Bedtime         traZODone (DESYREL) tablet 50 mg  50 mg Oral At Bedtime PRN         No current [facility]-ordered outpatient medications on file.              10 point ROS negative        Allergies:   No Known Allergies         Psychiatric Examination:   /63   Pulse 73   Temp 97  F (36.1  C) (Tympanic)  " " Resp 16   Ht 1.93 m (6' 4\")   Wt (!) 173.7 kg (382 lb 14.4 oz)   SpO2 97%   BMI 46.61 kg/m    Weight is 382 lbs 14.4 oz  Body mass index is 46.61 kg/m .     MSE/PSYCH  PSYCHIATRIC EXAM  /63   Pulse 73   Temp 97  F (36.1  C) (Tympanic)   Resp 16   Ht 1.93 m (6' 4\")   Wt (!) 173.7 kg (382 lb 14.4 oz)   SpO2 97%   BMI 46.61 kg/m    -Appearance/Behavior: still a bit flat  -Motor: intact  -Gait: intact  -Abnormal involuntary movements: none  -Mood: reactive and calm  -Affect: a bit brighter.   -Speech: regular      -Thought process/associations: Logical and Goal directed.  -Thought content: no delusions or hallucinations  -Perceptual disturbances: No hallucinations..              -Suicidal/Homicidal Ideation: denies any   -Judgment: Good.  -Insight: Good.  *Orientation: time, place and person.  *Memory: intact  *Attention: intact  *Language: fluent, no aphasias, able to repeat phrases and name objects. Vocab intact.  *Fund of information: appropriate for education  *Cognitive functioning estimate: 0 - independent.             Labs:     Results for orders placed or performed during the hospital encounter of 08/26/20   GGT     Status: None   Result Value Ref Range    GGT 45 0 - 75 U/L   Hepatic panel     Status: Abnormal   Result Value Ref Range    Bilirubin Direct 0.1 0.0 - 0.2 mg/dL    Bilirubin Total 0.6 0.2 - 1.3 mg/dL    Albumin 3.3 (L) 3.4 - 5.0 g/dL    Protein Total 6.9 6.8 - 8.8 g/dL    Alkaline Phosphatase 82 40 - 150 U/L    ALT 27 0 - 70 U/L    AST 13 0 - 45 U/L              Plan/Treatment Team     BEHAVIORAL TEAM DISCUSSION    Progress: states he feels improved.     Continued Stay Criteria/Rationale: will monitor another day for sleep and suicidaltiy. Likely discharge home tomorrow.     Medical/Physical: none    Precautions: none  Falls precaution?: No  Behavioral Orders   Procedures     Code 1 - Restrict to Unit     Routine Programming     As clinically indicated     Status 15     Every 15 " minutes.                     Plan for this encounter/Med Changes/Labs:       Continue prozac and minipress  Start naltrexone            Participants: Lynne Winston NP,  Social work, OT,         JOHNATHAN likely one more day. Will call his mother prior to discharge.

## 2020-08-28 LAB — INTERPRETATION ECG - MUSE: NORMAL

## 2020-08-28 PROCEDURE — 25000132 ZZH RX MED GY IP 250 OP 250 PS 637: Performed by: NURSE PRACTITIONER

## 2020-08-28 PROCEDURE — 99239 HOSP IP/OBS DSCHRG MGMT >30: CPT | Performed by: NURSE PRACTITIONER

## 2020-08-28 RX ORDER — NALTREXONE HYDROCHLORIDE 50 MG/1
50 TABLET, FILM COATED ORAL DAILY
Qty: 30 TABLET | Refills: 0 | Status: SHIPPED | OUTPATIENT
Start: 2020-08-29 | End: 2022-02-03

## 2020-08-28 RX ORDER — PRAZOSIN HYDROCHLORIDE 1 MG/1
1 CAPSULE ORAL AT BEDTIME
Qty: 30 CAPSULE | Refills: 0 | Status: SHIPPED | OUTPATIENT
Start: 2020-08-28 | End: 2022-02-03

## 2020-08-28 RX ADMIN — FLUOXETINE 20 MG: 20 CAPSULE ORAL at 08:20

## 2020-08-28 RX ADMIN — NALTREXONE HYDROCHLORIDE 50 MG: 50 TABLET, FILM COATED ORAL at 08:20

## 2020-08-28 NOTE — PLAN OF CARE
"Patient up to breakfast and let writer know, \"I feel much better and I'm ready to leave\". Discharge order has been written, patient will call Mother to come and pick him up.  Bright affect. Denies depression, denies anxiety, no thoughts of suicidal ideation.  "

## 2020-08-28 NOTE — DISCHARGE SUMMARY
"     Psychiatric Discharge Summary    Ant Tejeda MRN# [id number]   Age: 25 year old YOB: 1995     Date of Admission:  8/26/2020  Date of Discharge:  8/28/2020 12:57 PM  Admitting Physician:  Paolo Pittman MD  Discharge Physician:  Lynne Winston NP          Event Leading to Hospitalization and Hospital Stay    Ant Tejeda is a 25 year old  male who brought him self to the [place] emergency room with severe anxiety, chest pain and suicidal ideation after presenting to the [place] emergency room the same day and being discharged home.  Reports racing heart shortness of breath hallucinations and difficulty sleeping.    Started having auditory and visual hallucinations 6 months ago seen shadows of the lines and also hearing noises including feeling like he hears his family's voices.            He reported that he has come close to attempting suicide 3 times in the past few months the last attempt was 3 months ago.  He has brought himself to the [place] emergency room multiple times in the past to attempt to get help therefore brought himself to the [place] emergency room.      drug screen was negative. Last drink was 2 days ago.        States that he only has hallucinations \"right when I fall asleep\". Denies they ever happen during the day. \"I see lines out of the corner of my eye.\" never has had command hallucinations. He states at night he will hear his mothers voices yelling his name or screaming. His affect is quite flat though he doesn't apear preoccupied. He denies having trauma as a child though this seems questionable as his mother was a single parent raising 4 children with no father around. He states he was often alone due to her having to work frequently.      He states he has never actually attempted to kill himself though \"I went for a knife a few months ago and my family got it away from me\".  He states he does not have a history of any self-injurious " "behavior such as cutting.  He reports that these hallucinations only occur \"just as I am about to fall asleep\" he does not have any sleep paralysis.  He denies having nightmares.  He is unsure if he snores at night.  He has never had a sleep study.     He denied any further suicidal thoughts and stated he had no hallucinations through the rest of his stay. His affect remained a bit flat though did improve. He was social with others on the unit. He was on contact with his mother while he was here. He states his mother is having his older sister stay with him for a while. I did try to contact his mother prior to his discharge. I was unable to get ahold of her. He is not at risk of harming self or others at this time.                At time of discharge, there is no evidence that patient is in immediate danger of self or others.        Diagnoses:     Mdd, reccurrent, severe  R/o hypnogogic hallucinations vs schizophreniform d/o  Alcohol use disorder, likely moderate   Type 2 diabetes       Labs:     Results for orders placed or performed during the hospital encounter of 08/26/20   GGT     Status: None   Result Value Ref Range    GGT 45 0 - 75 U/L   Hepatic panel     Status: Abnormal   Result Value Ref Range    Bilirubin Direct 0.1 0.0 - 0.2 mg/dL    Bilirubin Total 0.6 0.2 - 1.3 mg/dL    Albumin 3.3 (L) 3.4 - 5.0 g/dL    Protein Total 6.9 6.8 - 8.8 g/dL    Alkaline Phosphatase 82 40 - 150 U/L    ALT 27 0 - 70 U/L    AST 13 0 - 45 U/L   Hemoglobin A1c     Status: None   Result Value Ref Range    Hemoglobin A1C 5.6 0 - 5.6 %   Hepatic panel     Status: Abnormal   Result Value Ref Range    Bilirubin Direct <0.1 0.0 - 0.2 mg/dL    Bilirubin Total 0.3 0.2 - 1.3 mg/dL    Albumin 3.1 (L) 3.4 - 5.0 g/dL    Protein Total 7.0 6.8 - 8.8 g/dL    Alkaline Phosphatase 80 40 - 150 U/L    ALT 34 0 - 70 U/L    AST 21 0 - 45 U/L   Estimated Average Glucose     Status: None   Result Value Ref Range    Estimated Average Glucose 114 mg/dL " "                 Discharge Medications:     Discharge Medication List as of 8/28/2020 10:43 AM      START taking these medications    Details   FLUoxetine (PROZAC) 20 MG capsule Take 1 capsule (20 mg) by mouth daily, Disp-30 capsule,R-0, E-Prescribe      naltrexone (DEPADE/REVIA) 50 MG tablet Take 1 tablet (50 mg) by mouth daily, Disp-30 tablet,R-0, E-Prescribe      prazosin (MINIPRESS) 1 MG capsule Take 1 capsule (1 mg) by mouth At Bedtime, Disp-30 capsule,R-0, E-Prescribe         STOP taking these medications       sertraline (ZOLOFT) 25 MG tablet Comments:   Reason for Stopping:                        Psychiatric Examination:   MSE/PSYCH  PSYCHIATRIC EXAM  /60   Pulse 56   Temp 98.1  F (36.7  C) (Tympanic)   Resp 16   Ht 1.93 m (6' 4\")   Wt (!) 173.7 kg (382 lb 14.4 oz)   SpO2 98%   BMI 46.61 kg/m    -Appearance/Behavior: normal and improved  -Motor: intact  -Gait: intact  -Abnormal involuntary movements: none  -Mood: reactive and calm  -Affect: brighter  -Speech: regular      -Thought process/associations: Logical and Goal directed.  -Thought content: no delusions or hallucinations  -Perceptual disturbances: No hallucinations..              -Suicidal/Homicidal Ideation: denies any   -Judgment: Good.  -Insight: Good.  *Orientation: time, place and person.  *Memory: intact  *Attention: intact  *Language: fluent, no aphasias, able to repeat phrases and name objects. Vocab intact.  *Fund of information: appropriate for education  *Cognitive functioning estimate: 0 - independent.           Discharge Plan:       Is going home with his mother today.               Attestation:  The patient has been seen and evaluated by me,  Lynne Winston NP         Discharge Services Provided:    45    minutes spent on discharge services, including:  Final examination of patient.  Review and discussion of Hospital stay.  Instructions for continued outpatient care/goals.  Preparation of discharge " records.  Preparation of medications refills and new prescriptions.  Preparation of Applicable referral forms.

## 2020-08-28 NOTE — PLAN OF CARE
1:1 time with the patient.  No changes made to the discharge plan at this time.   See the AVS for follow up appointments and recommendations.       Spoke with patient. He was open to the idea of [facility] following for the next three days after discharge.    Spoke to Alem with [facility]. She stated [facility] will follow up with patient the next few days.    Spoke and updated patient that [facility] will follow up with him for the next three days.

## 2020-08-28 NOTE — PLAN OF CARE
Pt is discharging at the recommendation of the treatment team. Pt is discharging to e transported by [facility]. Pt denies having any thoughts of hurting themself or anyone else. Pt denies anxiety or depression. Pt has follow up with [facility] for medication management and therapy, and Dr. Cornell at [facility]. Discharge instructions, including; demographic sheet, psychiatric evaluation, discharge summary, and AVS were faxed to these next level of care providers.

## 2020-08-28 NOTE — DISCHARGE INSTRUCTIONS
Behavioral Discharge Planning and Instructions    Summary:   Ant Tejeda is a 25 year old  male who brought him self to the [place] emergency room with severe anxiety, chest pain and suicidal ideation after presenting to the [place] emergency room the same day     Main Diagnosis:   Mdd, reccurrent, severe  R/o hypnogogic hallucinations vs schizophreniform d/o  Alcohol use disorder, likely moderate   Type 2 diabetes    Major Treatments, Procedures and Findings: Stabilize with medications, connect with community programs.    Symptoms to Report: feeling more aggressive, increased confusion, losing more sleep, mood getting worse or thoughts of suicide    Lifestyle Adjustment: Take all medications as prescribed, meet with doctor/ medication provider, out patient therapist, , and [facility] worker as scheduled. Abstain from alcohol or any unprescribed drugs.    Psychiatry Follow-up:     [facility]  [address] [place] [address]  Appointment: 09/04/2020 @ 12:40 pm with Aung Cornell MD for After Hosptial Follow up and Establishing Care.   [phone]    [facility]  Outpatient Substance Use Services & Therapy Office  [address] [place] [id number]  Appointment: 9/02/2020 @ 8:00 am for paper worker and 9:00 am  For medication management with Sri Brar.  Appointment: 9/14/2020 @ 9:00 am for paper work and 10:00 am for intake for therapy.   RN-BC, BSN   Phone: [phone] Fax: [phone]    [facility]  Outpatient Therapy & Psychiatry Office  [address] [address]  Phone: [phone] Fax: [phone]    Crisis Response Team - [facility]  [phone]  Or 211  Fax: [phone]    Resources:   Crisis Intervention: 134.340.1915 or 982-062-3493 (TTY: 199.918.4745).  Call anytime for help.  National Ward on Mental Illness (www.[place].juani.org): 978.938.4946  "or 281-496-7667.  Alcoholics Anonymous (www.alcoholics-anonymous.org): Check your phone book for your local chapter.  Suicide Awareness Voices of Education (SAVE) (www.save.org): 962-669-PGJV (2901)  National Suicide Prevention Line (www.mentalhealthmn.org): 870-353-FILB (8063)  Mental Health Consumer/Survivor Network of [place] (www.mhcsn.net): 504.907.3774 or 435-147-7855  Mental Health Association of [place] (www.mentalhealth.org): 644.752.5569 or 313-389-6109    General Medication Instructions:   See your medication sheet(s) for instructions.   Take all medicines as directed.  Make no changes unless your doctor suggests them.   Go to all your doctor visits.  Be sure to have all your required lab tests. This way, your medicines can be refilled on time.  Do not use any drugs not prescribed by your doctor.  Avoid alcohol.    [place] Area:  [facility], Crisis stabilization [facility]- [phone]  [facility] Crisis Line: 1-568.506.3259  [facility]ates For Family Peace: 664-7008  Sexual Assault Program [facility] [place]: 399.331.3974 or 1-476.251.5111  [place] Battered Women's Program: 1-625-227-7289 Ext: 279       Calls answered Mon-Fri-8:00 am--4:30 pm    [place]:  [facility] Family Peace: 5-303-601-7086  [facility] - 3-847-754-5282  [facility] first call for help: 4-492-498-3595  [facility] Counsel[facility] Crisis Center:  (297) [phone]    [place]:  Warm Line: 1-593.803.8019       Calls answered Tuesday--Saturday 4:00 pm--10:00 pm  [facility] Crisis Line - 892.670.6298  [place] [facility] Stabil[facility] 024-102-0936    [place] Statewide:  [place] Crisis and Referral Services: 9-703-367-6544  National Suicide Prevention Lifeline: 6-363-576-TALK (3178)   - xkh3sqwf- Text \"Life\" to 90651  First Call for Help: 2-1-1  [facility] Helpline- 8-711-BUGA-HELP   Crisis Text Line: Text  [place]  to 628814    "

## 2020-08-28 NOTE — PLAN OF CARE
Discharge Note    Patient Discharged to home on 8/28/2020 1257 PM via Private Car accompanied by [facility]  & staff.     Patient informed of discharge instructions in AVS. patient verbalizes understanding and denies having any questions pertaining to AVS. Patient stable at time of discharge. Patient denies SI, HI, and thoughts of self harm at time of discharge. All personal belongings returned to patient. Discharge prescriptions sent to [facility] via electronic communication, staff picked up prescriptions and were given to patient at discharge.. Psych evaluation, history and physical, AVS, and discharge summary faxed to next level of care- per .     Hallie Cook RN  8/28/2020  11:30 AM

## 2020-08-28 NOTE — PLAN OF CARE
Problem: Behavioral Health Plan of Care  Goal: Patient-Specific Goal (Individualization)  Description: Pt will attend at least 50% of groups.  Pt will sleep at least 6-8 hours per night.  Pt will be compliant with ordered medications and treatment team recommendations.  8/27/2020 2349 by Airam Mir, RN  Outcome: No Change  Note:        Problem: Suicide Risk  Goal: Absence of Self-Harm  Description: Pt will be free from self harm during hospital stay.  Pt will verbalize decrease depression and suicide Ideation by discharge.  Pt will verbalize suicide prevention plan by discharge.  8/27/2020 2349 by Airam Mir, RN  Outcome: No Change     Face to face shift report received from Kasandra ROACH. Rounding completed, pt observed.     Pt appeared to be sleeping most of this shift. Pt did not have any noted episodes of self harm or injury this shift.    Face to face report will be communicated to [place] RN.    Airam Mir RN  8/28/2020  5:51 AM

## 2020-09-04 ENCOUNTER — VIRTUAL VISIT (OUTPATIENT)
Dept: FAMILY MEDICINE | Facility: OTHER | Age: 25
End: 2020-09-04
Attending: FAMILY MEDICINE
Payer: COMMERCIAL

## 2020-09-04 VITALS — BODY MASS INDEX: 46.26 KG/M2 | WEIGHT: 315 LBS

## 2020-09-04 DIAGNOSIS — F32.1 CURRENT MODERATE EPISODE OF MAJOR DEPRESSIVE DISORDER WITHOUT PRIOR EPISODE (H): Primary | ICD-10-CM

## 2020-09-04 PROCEDURE — 99442 ZZC PHYSICIAN TELEPHONE EVALUATION 11-20 MIN: CPT | Performed by: FAMILY MEDICINE

## 2020-09-04 ASSESSMENT — ANXIETY QUESTIONNAIRES
3. WORRYING TOO MUCH ABOUT DIFFERENT THINGS: NOT AT ALL
1. FEELING NERVOUS, ANXIOUS, OR ON EDGE: NOT AT ALL
5. BEING SO RESTLESS THAT IT IS HARD TO SIT STILL: NOT AT ALL
GAD7 TOTAL SCORE: 0
IF YOU CHECKED OFF ANY PROBLEMS ON THIS QUESTIONNAIRE, HOW DIFFICULT HAVE THESE PROBLEMS MADE IT FOR YOU TO DO YOUR WORK, TAKE CARE OF THINGS AT HOME, OR GET ALONG WITH OTHER PEOPLE: NOT DIFFICULT AT ALL
6. BECOMING EASILY ANNOYED OR IRRITABLE: NOT AT ALL
2. NOT BEING ABLE TO STOP OR CONTROL WORRYING: NOT AT ALL
7. FEELING AFRAID AS IF SOMETHING AWFUL MIGHT HAPPEN: NOT AT ALL

## 2020-09-04 ASSESSMENT — PAIN SCALES - GENERAL: PAINLEVEL: NO PAIN (0)

## 2020-09-04 ASSESSMENT — PATIENT HEALTH QUESTIONNAIRE - PHQ9: 5. POOR APPETITE OR OVEREATING: NOT AT ALL

## 2020-09-04 NOTE — PROGRESS NOTES
"Ant Tejeda is a 25 year old male who is being evaluated via a billable telephone visit.      The patient has been notified of following:     \"This telephone visit will be conducted via a call between you and your physician/provider. We have found that certain health care needs can be provided without the need for a physical exam.  This service lets us provide the care you need with a short phone conversation.  If a prescription is necessary we can send it directly to your pharmacy.  If lab work is needed we can place an order for that and you can then stop by our lab to have the test done at a later time.    Telephone visits are billed at different rates depending on your insurance coverage. During this emergency period, for some insurers they may be billed the same as an in-person visit.  Please reach out to your insurance provider with any questions.    If during the course of the call the physician/provider feels a telephone visit is not appropriate, you will not be charged for this service.\"    Patient has given verbal consent for Telephone visit?  Yes    What phone number would you like to be contacted at? [phone]    How would you like to obtain your AVS?     Subjective     Ant Tejeda is a 25 year old male who presents via phone visit today for the following health issues:    [facility]      Hospital Follow-up Visit:    Hospital/Nursing Home/IP Rehab Facility: [facility]  Date of Admission: 8/25/2020  Date of Discharge: 8/26/2020  Reason(s) for Admission: hallucinations      Was your hospitalization related to COVID-19? No   Problems taking medications regularly:  None  Medication changes since discharge: None  Problems adhering to non-medication therapy:  None    Summary of hospitalization:  [facility] discharge summary reviewed  Diagnostic Tests/Treatments reviewed.  Follow up needed: none  Other Healthcare Providers Involved in Patient s Care:         None  Update since " discharge: telephone visit  Post Discharge Medication Reconciliation: medication reconcilation previously completed during another office visit.  Plan of care communicated with patient            he has no more hallucinations.  He sleeps much more now.  Was getting only 4-5 hours, per night. Now 9-10.  He changed to prozac now.  Feels this is working well without significant side effects.  Lives alone, works at the [employer], he is close to his mom.  Does not see a counselor.  Older sister has schizophrenia at age 26 or so.  He keep his home cleaned, misses meals sometimes, but usually due to working a lot.      No flowsheet data found.        Review of Systems   Constitutional, HEENT, cardiovascular, pulmonary, gi and gu systems are negative, except as otherwise noted.       Objective          Vitals:  No vitals were obtained today due to virtual visit.    healthy, alert and no distress  PSYCH: Alert and oriented times 3; coherent speech, normal   rate and volume, able to articulate logical thoughts, able   to abstract reason, no tangential thoughts, no hallucinations   or delusions  His affect is normal  RESP: No cough, no audible wheezing, able to talk in full sentences  Remainder of exam unable to be completed due to telephone visits             Assessment/Plan:    Assessment & Plan   Problem List Items Addressed This Visit        Behavioral    Major depressive disorder - Primary         I have some concerns for arturo schizophrenia, given age, FH and vague but mild hallucinations. No voices at this point.  Will do close follow up with me in 4 weeks or so.  prozac helping with depressive symptoms.      Tobacco Cessation:   reports that he has been smoking cigarettes. He has a 1.25 pack-year smoking history. He has never used smokeless tobacco.  Tobacco Cessation Action Plan: Information offered: Patient not interested at this time      BMI:   Estimated body mass index is 46.26 kg/m  as calculated from the  "following:    Height as of 8/26/20: 1.93 m (6' 4\").    Weight as of this encounter: 172.4 kg (380 lb).   Weight management plan: Discussed healthy diet and exercise guidelines          Patient Safety Assessment:    After gathering the above information, Ant presents the following high risk factors for suicide: male.  Ant denies current fears or concerns for personal safety.    Ant has the following Protective Factors: Life Satisfaction, Positive coping skills, Positive social support and Positive therapeutic releationships      Upon review of the patient interview, identification of the above factors, safety strategy alternatives, and treatment plan interventions: close jose w me     Depression Resources Provided: Coping with Depression  No follow-ups on file.    Aung Cornell MD  [facility]PITAL    Phone call duration:  12 minutes                "

## 2020-09-04 NOTE — NURSING NOTE
Calling in regard to a f/u from a Hospital visit     Manuela Gunderson LPN on 9/4/2020 at 12:42 PM

## 2020-09-05 ASSESSMENT — ANXIETY QUESTIONNAIRES: GAD7 TOTAL SCORE: 0

## 2020-10-08 ENCOUNTER — NURSE TRIAGE (OUTPATIENT)
Dept: NURSING | Facility: CLINIC | Age: 25
End: 2020-10-08

## 2020-10-19 ENCOUNTER — HOSPITAL ENCOUNTER (EMERGENCY)
Facility: OTHER | Age: 25
Discharge: HOME | End: 2020-10-19
Attending: FAMILY MEDICINE | Admitting: FAMILY MEDICINE
Payer: COMMERCIAL

## 2020-10-19 ENCOUNTER — APPOINTMENT (OUTPATIENT)
Dept: GENERAL RADIOLOGY | Facility: OTHER | Age: 25
End: 2020-10-19
Attending: FAMILY MEDICINE
Payer: COMMERCIAL

## 2020-10-19 VITALS
WEIGHT: 315 LBS | HEIGHT: 77 IN | DIASTOLIC BLOOD PRESSURE: 77 MMHG | HEART RATE: 70 BPM | RESPIRATION RATE: 15 BRPM | TEMPERATURE: 98.7 F | OXYGEN SATURATION: 99 % | BODY MASS INDEX: 37.19 KG/M2 | SYSTOLIC BLOOD PRESSURE: 150 MMHG

## 2020-10-19 DIAGNOSIS — R11.2 NAUSEA AND VOMITING, INTRACTABILITY OF VOMITING NOT SPECIFIED, UNSPECIFIED VOMITING TYPE: ICD-10-CM

## 2020-10-19 LAB
ALBUMIN SERPL-MCNC: 4.2 G/DL (ref 3.5–5.7)
ALBUMIN UR-MCNC: NEGATIVE MG/DL
ALP SERPL-CCNC: 86 U/L (ref 34–104)
ALT SERPL W P-5'-P-CCNC: 25 U/L (ref 7–52)
ANION GAP SERPL CALCULATED.3IONS-SCNC: 10 MMOL/L (ref 3–14)
APPEARANCE UR: CLEAR
AST SERPL W P-5'-P-CCNC: 15 U/L (ref 13–39)
BASOPHILS # BLD AUTO: 0 10E9/L (ref 0–0.2)
BASOPHILS NFR BLD AUTO: 0.3 %
BILIRUB SERPL-MCNC: 0.5 MG/DL (ref 0.3–1)
BILIRUB UR QL STRIP: NEGATIVE
BUN SERPL-MCNC: 11 MG/DL (ref 7–25)
CALCIUM SERPL-MCNC: 9.1 MG/DL (ref 8.6–10.3)
CHLORIDE SERPL-SCNC: 101 MMOL/L (ref 98–107)
CO2 SERPL-SCNC: 26 MMOL/L (ref 21–31)
COLOR UR AUTO: YELLOW
CREAT SERPL-MCNC: 0.66 MG/DL (ref 0.7–1.3)
CRP SERPL-MCNC: 4.8 MG/L
DIFFERENTIAL METHOD BLD: NORMAL
EOSINOPHIL # BLD AUTO: 0.1 10E9/L (ref 0–0.7)
EOSINOPHIL NFR BLD AUTO: 0.9 %
ERYTHROCYTE [DISTWIDTH] IN BLOOD BY AUTOMATED COUNT: 12.5 % (ref 10–15)
GFR SERPL CREATININE-BSD FRML MDRD: >90 ML/MIN/{1.73_M2}
GLUCOSE SERPL-MCNC: 133 MG/DL (ref 70–105)
GLUCOSE UR STRIP-MCNC: NEGATIVE MG/DL
HCT VFR BLD AUTO: 42.3 % (ref 40–53)
HGB BLD-MCNC: 14.4 G/DL (ref 13.3–17.7)
HGB UR QL STRIP: NEGATIVE
IMM GRANULOCYTES # BLD: 0 10E9/L (ref 0–0.4)
IMM GRANULOCYTES NFR BLD: 0.2 %
KETONES UR STRIP-MCNC: NEGATIVE MG/DL
LACTATE BLD-SCNC: 1.5 MMOL/L (ref 0.7–2)
LEUKOCYTE ESTERASE UR QL STRIP: NEGATIVE
LIPASE SERPL-CCNC: 17 U/L (ref 11–82)
LYMPHOCYTES # BLD AUTO: 1.7 10E9/L (ref 0.8–5.3)
LYMPHOCYTES NFR BLD AUTO: 18.8 %
MAGNESIUM SERPL-MCNC: 1.9 MG/DL (ref 1.9–2.7)
MCH RBC QN AUTO: 29.6 PG (ref 26.5–33)
MCHC RBC AUTO-ENTMCNC: 34 G/DL (ref 31.5–36.5)
MCV RBC AUTO: 87 FL (ref 78–100)
MONOCYTES # BLD AUTO: 0.6 10E9/L (ref 0–1.3)
MONOCYTES NFR BLD AUTO: 6.1 %
MUCOUS THREADS #/AREA URNS LPF: PRESENT /LPF
NEUTROPHILS # BLD AUTO: 6.7 10E9/L (ref 1.6–8.3)
NEUTROPHILS NFR BLD AUTO: 73.7 %
NITRATE UR QL: NEGATIVE
PH UR STRIP: 6.5 PH (ref 5–7)
PLATELET # BLD AUTO: 364 10E9/L (ref 150–450)
POTASSIUM SERPL-SCNC: 3.5 MMOL/L (ref 3.5–5.1)
PROCALCITONIN SERPL-MCNC: <0.5 NG/ML
PROT SERPL-MCNC: 7.3 G/DL (ref 6.4–8.9)
RBC # BLD AUTO: 4.87 10E12/L (ref 4.4–5.9)
RBC #/AREA URNS AUTO: <1 /HPF (ref 0–2)
SODIUM SERPL-SCNC: 137 MMOL/L (ref 134–144)
SOURCE: ABNORMAL
SP GR UR STRIP: 1.01 (ref 1–1.03)
UROBILINOGEN UR STRIP-MCNC: NORMAL MG/DL (ref 0–2)
WBC # BLD AUTO: 9.1 10E9/L (ref 4–11)
WBC #/AREA URNS AUTO: 1 /HPF (ref 0–5)

## 2020-10-19 PROCEDURE — 99284 EMERGENCY DEPT VISIT MOD MDM: CPT | Mod: 25 | Performed by: FAMILY MEDICINE

## 2020-10-19 PROCEDURE — 83735 ASSAY OF MAGNESIUM: CPT | Performed by: FAMILY MEDICINE

## 2020-10-19 PROCEDURE — 96361 HYDRATE IV INFUSION ADD-ON: CPT | Performed by: FAMILY MEDICINE

## 2020-10-19 PROCEDURE — 96374 THER/PROPH/DIAG INJ IV PUSH: CPT | Performed by: FAMILY MEDICINE

## 2020-10-19 PROCEDURE — 80053 COMPREHEN METABOLIC PANEL: CPT | Performed by: FAMILY MEDICINE

## 2020-10-19 PROCEDURE — 36415 COLL VENOUS BLD VENIPUNCTURE: CPT | Mod: XU | Performed by: FAMILY MEDICINE

## 2020-10-19 PROCEDURE — 258N000003 HC RX IP 258 OP 636: Performed by: FAMILY MEDICINE

## 2020-10-19 PROCEDURE — 83690 ASSAY OF LIPASE: CPT | Performed by: FAMILY MEDICINE

## 2020-10-19 PROCEDURE — 85025 COMPLETE CBC W/AUTO DIFF WBC: CPT | Performed by: FAMILY MEDICINE

## 2020-10-19 PROCEDURE — 83605 ASSAY OF LACTIC ACID: CPT | Performed by: FAMILY MEDICINE

## 2020-10-19 PROCEDURE — 84145 PROCALCITONIN (PCT): CPT | Performed by: FAMILY MEDICINE

## 2020-10-19 PROCEDURE — 74019 RADEX ABDOMEN 2 VIEWS: CPT

## 2020-10-19 PROCEDURE — 87040 BLOOD CULTURE FOR BACTERIA: CPT | Mod: 91 | Performed by: FAMILY MEDICINE

## 2020-10-19 PROCEDURE — 250N000011 HC RX IP 250 OP 636: Performed by: FAMILY MEDICINE

## 2020-10-19 PROCEDURE — 86140 C-REACTIVE PROTEIN: CPT | Performed by: FAMILY MEDICINE

## 2020-10-19 PROCEDURE — 99283 EMERGENCY DEPT VISIT LOW MDM: CPT | Performed by: FAMILY MEDICINE

## 2020-10-19 PROCEDURE — 81001 URINALYSIS AUTO W/SCOPE: CPT | Performed by: FAMILY MEDICINE

## 2020-10-19 RX ORDER — ONDANSETRON 2 MG/ML
4 INJECTION INTRAMUSCULAR; INTRAVENOUS ONCE
Status: COMPLETED | OUTPATIENT
Start: 2020-10-19 | End: 2020-10-19

## 2020-10-19 RX ORDER — ONDANSETRON 4 MG/1
4 TABLET, FILM COATED ORAL EVERY 8 HOURS PRN
Qty: 6 TABLET | Refills: 0 | Status: SHIPPED | OUTPATIENT
Start: 2020-10-19 | End: 2022-02-03

## 2020-10-19 RX ADMIN — ONDANSETRON 4 MG: 2 INJECTION INTRAMUSCULAR; INTRAVENOUS at 09:11

## 2020-10-19 RX ADMIN — SODIUM CHLORIDE 1000 ML: 9 INJECTION, SOLUTION INTRAVENOUS at 09:00

## 2020-10-19 ASSESSMENT — ENCOUNTER SYMPTOMS
ABDOMINAL DISTENTION: 0
NERVOUS/ANXIOUS: 1
CARDIOVASCULAR NEGATIVE: 1
MUSCULOSKELETAL NEGATIVE: 1
ANAL BLEEDING: 0
SLEEP DISTURBANCE: 1
CHILLS: 1
NAUSEA: 1
DYSPHORIC MOOD: 1
DIAPHORESIS: 1
DIARRHEA: 1
VOMITING: 0
HEMATOLOGIC/LYMPHATIC NEGATIVE: 1
BLOOD IN STOOL: 0
RESPIRATORY NEGATIVE: 1
DIZZINESS: 1
APPETITE CHANGE: 1
ABDOMINAL PAIN: 0
CONSTIPATION: 0

## 2020-10-19 ASSESSMENT — MIFFLIN-ST. JEOR: SCORE: 2808.37

## 2020-10-19 NOTE — ED TRIAGE NOTES
"ED Nursing Triage Note (General)   ________________________________    Ant Tejeda is a 25 year old Male that presents to triage private car  With history of  Abdominal pain, diarrhea, nausea, reported at work so they sent him to the ED reported by patient   Significant symptoms had onset 24 hour(s) ago.  Diarrhea any time he eats or drinks since midnight  BP (!) 167/76   Pulse 66   Temp 98.7  F (37.1  C) (Tympanic)   Resp 15   Ht 1.956 m (6' 5\")   Wt (!) 170.1 kg (375 lb)   SpO2 99%   BMI 44.47 kg/m  t  Patient appears alert , in mild distress., and cooperative behavior.    GCS Total = 15  Airway: intact  Breathing noted as Normal.  Circulation Normal  Skin normal  Action taken: to waiting room      PRE HOSPITAL PRIOR LIVING SITUATION Alone  "

## 2020-10-19 NOTE — DISCHARGE INSTRUCTIONS
Follow recommendations per DEC  .Clear liquids . [place] diet today , advance as tolerated. Drink plenty of fluids so that you don't get dehydrated . You may take the zofran as needed for nausea and vomitting. Follow up in clinic or ER if your symptoms are not improving after 5 days or if you should develop worsening symptoms including localizing lower abdominal pain associated with fever or any other symptoms of concern

## 2020-10-25 LAB
BACTERIA SPEC CULT: NORMAL
BACTERIA SPEC CULT: NORMAL
SPECIMEN SOURCE: NORMAL
SPECIMEN SOURCE: NORMAL

## 2021-04-12 NOTE — ED PROVIDER NOTES
History     Chief Complaint   Patient presents with     Abdominal Pain     Diarrhea     Nausea     HPI  Ant Tejeda is a 25 year old male who presents for evaluation of nausea and diarrhea that started yesterday through out the day . Patient states seemed like every thing he ate or drank seemed to go right through him . NO significant abdominal pain . NO fever.  Has had some chills and sweats. Co worker has similar symptoms.No suspicious foods. Works at [employer] NO cough or URI symptoms .  Was sent to ER from work today for evaluation secondary to symptoms NO loss of taste or loss of smell. Patient declines need for covid tst   Patient had requested DEC assessment upon arrival . Increased stress , hasnt slept as significant other left abruptly over weekend . Suicidal ideations but no plan . Ran out of his naltrexone and antitdepressants   Allergies:  No Known Allergies    Problem List:    Patient Active Problem List    Diagnosis Date Noted     Major depressive disorder 08/26/2020     Priority: Medium        Past Medical History:    History reviewed. No pertinent past medical history.    Past Surgical History:    History reviewed. No pertinent surgical history.    Family History:    History reviewed. No pertinent family history.    Social History:  Marital Status:  Single [1]  Social History     Tobacco Use     Smoking status: Former Smoker     Packs/day: 0.25     Years: 5.00     Pack years: 1.25     Types: Cigarettes     Smokeless tobacco: Never Used     Tobacco comment: Pt denies want of counseling    Substance Use Topics     Alcohol use: Yes     Comment: once a month     Drug use: Never        Medications:         FLUoxetine (PROZAC) 20 MG capsule       naltrexone (DEPADE/REVIA) 50 MG tablet       prazosin (MINIPRESS) 1 MG capsule          Review of Systems   Constitutional: Positive for appetite change, chills and diaphoresis.   HENT: Negative.    Respiratory: Negative.    Cardiovascular: Negative.  "   Gastrointestinal: Positive for diarrhea and nausea. Negative for abdominal distention, abdominal pain, anal bleeding, blood in stool, constipation and vomiting.   Genitourinary: Negative.    Musculoskeletal: Negative.    Neurological: Positive for dizziness.   Hematological: Negative.    Psychiatric/Behavioral: Positive for dysphoric mood, sleep disturbance and suicidal ideas. The patient is nervous/anxious.    All other systems reviewed and are negative.      Physical Exam   BP: (!) 167/76  Pulse: 66  Temp: 98.7  F (37.1  C)  Resp: 15  Height: 195.6 cm (6' 5\")  Weight: (!) 170.1 kg (375 lb)  SpO2: 99 %      Physical Exam  Vitals signs and nursing note reviewed.   Constitutional:       General: He is not in acute distress.     Appearance: He is obese. He is not ill-appearing, toxic-appearing or diaphoretic.   HENT:      Mouth/Throat:      Mouth: Mucous membranes are moist.   Cardiovascular:      Rate and Rhythm: Normal rate and regular rhythm.   Neurological:      Mental Status: He is alert.         ED Course        Procedures          Patient presents to ER with concern of nausea and diarrhea that started throughout the day . Patient triaged to exam room. Vital signs reviewed. History and exam completed Patient given 1 liter NS and zofran with significant improvement in symptoms DEC assessment completed. REcommendations discussed.Labs not suggestive of significant dehydration or electrolyte abnormality . Patient discharged to home with prescription for zofran. REcommend clear liquids or bland diet. Patient will follow up with [facility] counseling later this week. Patient discharged from ER in improved condition         Results for orders placed or performed during the hospital encounter of 10/19/20 (from the past 24 hour(s))   CBC with platelets differential   Result Value Ref Range    WBC 9.1 4.0 - 11.0 10e9/L    RBC Count 4.87 4.4 - 5.9 10e12/L    Hemoglobin 14.4 13.3 - 17.7 g/dL    Hematocrit 42.3 40.0 - 53.0 " %    MCV 87 78 - 100 fl    MCH 29.6 26.5 - 33.0 pg    MCHC 34.0 31.5 - 36.5 g/dL    RDW 12.5 10.0 - 15.0 %    Platelet Count 364 150 - 450 10e9/L    Diff Method Automated Method     % Neutrophils 73.7 %    % Lymphocytes 18.8 %    % Monocytes 6.1 %    % Eosinophils 0.9 %    % Basophils 0.3 %    % Immature Granulocytes 0.2 %    Absolute Neutrophil 6.7 1.6 - 8.3 10e9/L    Absolute Lymphocytes 1.7 0.8 - 5.3 10e9/L    Absolute Monocytes 0.6 0.0 - 1.3 10e9/L    Absolute Eosinophils 0.1 0.0 - 0.7 10e9/L    Absolute Basophils 0.0 0.0 - 0.2 10e9/L    Abs Immature Granulocytes 0.0 0 - 0.4 10e9/L   Comprehensive metabolic panel   Result Value Ref Range    Sodium 137 134 - 144 mmol/L    Potassium 3.5 3.5 - 5.1 mmol/L    Chloride 101 98 - 107 mmol/L    Carbon Dioxide 26 21 - 31 mmol/L    Anion Gap 10 3 - 14 mmol/L    Glucose 133 (H) 70 - 105 mg/dL    Urea Nitrogen 11 7 - 25 mg/dL    Creatinine 0.66 (L) 0.70 - 1.30 mg/dL    GFR Estimate >90 >60 mL/min/[1.73_m2]    GFR Estimate If Black >90 >60 mL/min/[1.73_m2]    Calcium 9.1 8.6 - 10.3 mg/dL    Bilirubin Total 0.5 0.3 - 1.0 mg/dL    Albumin 4.2 3.5 - 5.7 g/dL    Protein Total 7.3 6.4 - 8.9 g/dL    Alkaline Phosphatase 86 34 - 104 U/L    ALT 25 7 - 52 U/L    AST 15 13 - 39 U/L   CRP inflammation   Result Value Ref Range    CRP Inflammation 4.8 <10.0 mg/L   Lactic acid   Result Value Ref Range    Lactic Acid 1.5 0.7 - 2.0 mmol/L   Lipase   Result Value Ref Range    Lipase 17 11 - 82 U/L   Procalcitonin   Result Value Ref Range    Procalcitonin <0.50 ng/ml   Magnesium   Result Value Ref Range    Magnesium 1.9 1.9 - 2.7 mg/dL       Medications   0.9% sodium chloride BOLUS (0 mLs Intravenous Stopped 10/19/20 1028)   ondansetron (ZOFRAN) injection 4 mg (4 mg Intravenous Given 10/19/20 0911)       Assessments & Plan (with Medical Decision Making)     I have reviewed the nursing notes.    I have reviewed the findings, diagnosis, plan and need for follow up with the patient.      New  Prescriptions    No medications on file       Final diagnoses:   Nausea and vomiting, intractability of vomiting not specified, unspecified vomiting type       10/19/2020   [facility] MD  10/21/20 2828     English Other

## 2021-05-21 ENCOUNTER — APPOINTMENT (OUTPATIENT)
Dept: GENERAL RADIOLOGY | Facility: OTHER | Age: 26
End: 2021-05-21
Attending: PHYSICIAN ASSISTANT
Payer: COMMERCIAL

## 2021-05-21 ENCOUNTER — HOSPITAL ENCOUNTER (EMERGENCY)
Facility: OTHER | Age: 26
Discharge: HOME | End: 2021-05-21
Attending: PHYSICIAN ASSISTANT | Admitting: PHYSICIAN ASSISTANT
Payer: COMMERCIAL

## 2021-05-21 VITALS
HEART RATE: 90 BPM | SYSTOLIC BLOOD PRESSURE: 148 MMHG | HEIGHT: 77 IN | OXYGEN SATURATION: 98 % | RESPIRATION RATE: 16 BRPM | BODY MASS INDEX: 37.19 KG/M2 | DIASTOLIC BLOOD PRESSURE: 65 MMHG | WEIGHT: 315 LBS | TEMPERATURE: 98 F

## 2021-05-21 DIAGNOSIS — M25.572 PAIN IN JOINT, ANKLE AND FOOT, LEFT: ICD-10-CM

## 2021-05-21 PROCEDURE — 73610 X-RAY EXAM OF ANKLE: CPT | Mod: LT

## 2021-05-21 PROCEDURE — 99283 EMERGENCY DEPT VISIT LOW MDM: CPT | Performed by: PHYSICIAN ASSISTANT

## 2021-05-21 PROCEDURE — 73630 X-RAY EXAM OF FOOT: CPT | Mod: LT

## 2021-05-21 ASSESSMENT — ENCOUNTER SYMPTOMS
CHEST TIGHTNESS: 0
SHORTNESS OF BREATH: 0
HEMATURIA: 0
BRUISES/BLEEDS EASILY: 0
FEVER: 0
ABDOMINAL PAIN: 0
BACK PAIN: 0
CHILLS: 0
WOUND: 0
ADENOPATHY: 0
CONFUSION: 0

## 2021-05-21 ASSESSMENT — MIFFLIN-ST. JEOR: SCORE: 3007.49

## 2021-05-21 NOTE — DISCHARGE INSTRUCTIONS
Get plenty of fluids and rest.  Follow the rice protocol, rest, ice, compression, elevation.  Use your crutches and ankle brace.  I expect her symptoms to gradually improve over the coming weeks, if they are not sure if they are getting worse then a referral was placed you to follow-up with PCP for reassessment.  Return the ED if there are greatly worsening or concerning.

## 2021-05-21 NOTE — ED PROVIDER NOTES
History     Chief Complaint   Patient presents with     Foot Injury     HPI  Ant Tejeda is a 26 year old male who presents to the ED for evaluation of left foot/ankle pain. history of up from chair, and feel to ground and unsure what he landed on, limping around after fall, pain in left foot and recommended to be seen, occurred at work. No other injuries reported.     Allergies:  No Known Allergies    Problem List:    Patient Active Problem List    Diagnosis Date Noted     Major depressive disorder 08/26/2020     Priority: Medium        Past Medical History:    History reviewed. No pertinent past medical history.    Past Surgical History:    History reviewed. No pertinent surgical history.    Family History:    History reviewed. No pertinent family history.    Social History:  Marital Status:  Single [1]  Social History     Tobacco Use     Smoking status: Former Smoker     Packs/day: 0.25     Years: 5.00     Pack years: 1.25     Types: Cigarettes     Smokeless tobacco: Never Used     Tobacco comment: Pt denies want of counseling    Substance Use Topics     Alcohol use: Yes     Comment: once a month     Drug use: Never        Medications:    FLUoxetine (PROZAC) 20 MG capsule  naltrexone (DEPADE/REVIA) 50 MG tablet  ondansetron (ZOFRAN) 4 MG tablet  prazosin (MINIPRESS) 1 MG capsule          Review of Systems   Constitutional: Negative for chills and fever.   HENT: Negative for congestion.    Eyes: Negative for visual disturbance.   Respiratory: Negative for chest tightness and shortness of breath.    Cardiovascular: Negative for chest pain.   Gastrointestinal: Negative for abdominal pain.   Genitourinary: Negative for hematuria.   Musculoskeletal: Negative for back pain.        Left foot/ankle pain   Skin: Negative for rash and wound.   Neurological: Negative for syncope.   Hematological: Negative for adenopathy. Does not bruise/bleed easily.   Psychiatric/Behavioral: Negative for confusion.       Physical  "Exam   BP: (!) 148/65  Pulse: 90  Temp: 98  F (36.7  C)  Resp: 16  Height: 195.6 cm (6' 5\")  Weight: (!) 190.5 kg (420 lb)  SpO2: 98 %      Physical Exam  Constitutional:       General: He is not in acute distress.     Appearance: He is well-developed. He is not diaphoretic.   HENT:      Head: Normocephalic and atraumatic.   Eyes:      General: No scleral icterus.     Conjunctiva/sclera: Conjunctivae normal.   Neck:      Musculoskeletal: Neck supple.   Cardiovascular:      Rate and Rhythm: Normal rate and regular rhythm.   Pulmonary:      Effort: Pulmonary effort is normal.      Breath sounds: Normal breath sounds.   Abdominal:      Palpations: Abdomen is soft.      Tenderness: There is no abdominal tenderness.   Musculoskeletal:         General: No deformity.      Comments: TTP medial left ankle area   Lymphadenopathy:      Cervical: No cervical adenopathy.   Skin:     General: Skin is warm and dry.      Findings: No rash.   Neurological:      Mental Status: He is alert and oriented to person, place, and time. Mental status is at baseline.   Psychiatric:         Mood and Affect: Mood normal.         Behavior: Behavior normal.         ED Course        Procedures               Critical Care time:  none               Results for orders placed or performed during the hospital encounter of 05/21/21 (from the past 24 hour(s))   XR Foot Left G/E 3 Views    Narrative    PROCEDURE: XR ANKLE LEFT G/E 3 VIEWS, XR FOOT LEFT G/E 3 VIEWS  5/21/2021 12:05 PM    HISTORY: left foot and ankle pain, states he fell this am at work,  limping on left foot.    COMPARISONS: None.    TECHNIQUE: 3 views of foot and 3 views of ankle.    FINDINGS: No ankle fracture is seen. Ankle mortise appears somewhat  widened medially.    No foot fracture or dislocation is seen. There is no focal bone  lesion.         Impression    IMPRESSION: No acute fracture. Mild asymmetric widening of the medial  ankle mortise.    JOYCE CHAVEZ MD   XR Ankle Left " G/E 3 Views    Narrative    PROCEDURE: XR ANKLE LEFT G/E 3 VIEWS, XR FOOT LEFT G/E 3 VIEWS  5/21/2021 12:05 PM    HISTORY: left foot and ankle pain, states he fell this am at work,  limping on left foot.    COMPARISONS: None.    TECHNIQUE: 3 views of foot and 3 views of ankle.    FINDINGS: No ankle fracture is seen. Ankle mortise appears somewhat  widened medially.    No foot fracture or dislocation is seen. There is no focal bone  lesion.         Impression    IMPRESSION: No acute fracture. Mild asymmetric widening of the medial  ankle mortise.    JOYCE CHAVEZ MD       Medications - No data to display    Assessments & Plan (with Medical Decision Making)   Pt nontoxic in NAD. Heart, lung, bowel sounds normal, abd soft, nontender to palpation, nondistended. VSS, afebrile.    He has TTP medial left ankle, no high ankle tenderness with squeeze, no left knee pain with palpation, good distal CMS.     Xrays read as No acute fracture. Mild asymmetric widening of the medial  ankle mortise.    Sx's seem most consistent with sprain.  He is given crutches, ankle brace.  Referral placed to follow-up with PCP in approximate 2 weeks for reassessment as needed.  Will use conservative treatment including rice protocol at home.    Strict return precautions are given to the pt, they will return if symptoms are worsening or concerning. The pt understands and agrees with the plan and they are discharged.     Aric Cristobal PA-C    I have reviewed the nursing notes.    I have reviewed the findings, diagnosis, plan and need for follow up with the patient.       New Prescriptions    No medications on file       Final diagnoses:   Pain in joint, ankle and foot, left       5/21/2021   [facility] AND [facility]     Aric Cristobal PA  05/21/21 8392

## 2021-05-21 NOTE — ED TRIAGE NOTES
"ED Nursing Triage Note (General)   ________________________________    Ant Tejeda is a 26 year old Male that presents to triage private car  With history of up from chair, and feel to ground and unsure what he landed on, limping around after fall, pain in left foot and recommended to be seen, occurred at work. reported by patient   Significant symptoms had onset 7 hour(s) ago.  BP (!) 148/65   Pulse 90   Temp 98  F (36.7  C) (Temporal)   Resp 16   Ht 1.956 m (6' 5\")   Wt (!) 190.5 kg (420 lb)   SpO2 98%   BMI 49.80 kg/m  t  Patient appears alert  and oriented, in no acute distress., and cooperative and calm  GCS Total = 15  Airway: intact  Breathing noted as Normal.  Circulation Normal  Skin normal, warm  Action taken:  Triage and awaiting room.    PRE HOSPITAL [facility]IOR LIVING SITUATION Alone  "

## 2021-05-28 ENCOUNTER — OFFICE VISIT (OUTPATIENT)
Dept: FAMILY MEDICINE | Facility: OTHER | Age: 26
End: 2021-05-28
Attending: FAMILY MEDICINE
Payer: COMMERCIAL

## 2021-05-28 VITALS
SYSTOLIC BLOOD PRESSURE: 140 MMHG | DIASTOLIC BLOOD PRESSURE: 72 MMHG | TEMPERATURE: 97.3 F | WEIGHT: 315 LBS | HEART RATE: 87 BPM | OXYGEN SATURATION: 97 % | BODY MASS INDEX: 54.07 KG/M2 | RESPIRATION RATE: 14 BRPM

## 2021-05-28 DIAGNOSIS — S93.402D SPRAIN OF LEFT ANKLE, UNSPECIFIED LIGAMENT, SUBSEQUENT ENCOUNTER: Primary | ICD-10-CM

## 2021-05-28 DIAGNOSIS — E66.01 MORBID OBESITY (H): ICD-10-CM

## 2021-05-28 PROCEDURE — G0463 HOSPITAL OUTPT CLINIC VISIT: HCPCS

## 2021-05-28 PROCEDURE — 99213 OFFICE O/P EST LOW 20 MIN: CPT | Performed by: FAMILY MEDICINE

## 2021-05-28 SDOH — HEALTH STABILITY: MENTAL HEALTH: HOW MANY STANDARD DRINKS CONTAINING ALCOHOL DO YOU HAVE ON A TYPICAL DAY?: NOT ASKED

## 2021-05-28 SDOH — HEALTH STABILITY: MENTAL HEALTH: HOW OFTEN DO YOU HAVE A DRINK CONTAINING ALCOHOL?: NOT ASKED

## 2021-05-28 SDOH — HEALTH STABILITY: MENTAL HEALTH: HOW OFTEN DO YOU HAVE 6 OR MORE DRINKS ON ONE OCCASION?: NOT ASKED

## 2021-05-28 ASSESSMENT — PATIENT HEALTH QUESTIONNAIRE - PHQ9: 5. POOR APPETITE OR OVEREATING: NOT AT ALL

## 2021-05-28 ASSESSMENT — ANXIETY QUESTIONNAIRES
7. FEELING AFRAID AS IF SOMETHING AWFUL MIGHT HAPPEN: NOT AT ALL
1. FEELING NERVOUS, ANXIOUS, OR ON EDGE: NOT AT ALL
3. WORRYING TOO MUCH ABOUT DIFFERENT THINGS: NOT AT ALL
6. BECOMING EASILY ANNOYED OR IRRITABLE: NOT AT ALL
IF YOU CHECKED OFF ANY PROBLEMS ON THIS QUESTIONNAIRE, HOW DIFFICULT HAVE THESE PROBLEMS MADE IT FOR YOU TO DO YOUR WORK, TAKE CARE OF THINGS AT HOME, OR GET ALONG WITH OTHER PEOPLE: NOT DIFFICULT AT ALL
2. NOT BEING ABLE TO STOP OR CONTROL WORRYING: NOT AT ALL
GAD7 TOTAL SCORE: 0
5. BEING SO RESTLESS THAT IT IS HARD TO SIT STILL: NOT AT ALL

## 2021-05-28 ASSESSMENT — PAIN SCALES - GENERAL: PAINLEVEL: MILD PAIN (2)

## 2021-05-28 NOTE — NURSING NOTE
"Coming in to get a return to work form done    Was seen in the ER left ankle injury    Chief Complaint   Patient presents with     RECHECK     was seen in the ER, ankle injury, needs a return to work       Initial BP (!) 140/72   Pulse 87   Temp 97.3  F (36.3  C)   Resp 14   Wt (!) 206.8 kg (456 lb)   SpO2 97%   BMI 54.07 kg/m   Estimated body mass index is 54.07 kg/m  as calculated from the following:    Height as of 5/21/21: 1.956 m (6' 5\").    Weight as of this encounter: 206.8 kg (456 lb).  Medication Reconciliation: complete    Manuela Gunderson LPN  "

## 2021-05-28 NOTE — PROGRESS NOTES
"    Assessment & Plan   Problem List Items Addressed This Visit        Digestive    Morbid obesity (H)      Other Visit Diagnoses     Sprain of left ankle, unspecified ligament, subsequent encounter    -  Primary         X-ray read mentioned possible mortise widening.  The pain is gone now, able to weight bear and has a normal exam, so I suspect either an old injury to account for this or just an anatomic variation.  Can return to work and follow up as needed. I shared the x-ray info with him, low threshold for MRI if x return.           BMI:   Estimated body mass index is 54.07 kg/m  as calculated from the following:    Height as of 5/21/21: 1.956 m (6' 5\").    Weight as of this encounter: 206.8 kg (456 lb).   Weight management plan: Discussed healthy diet and exercise guidelines        Return if symptoms worsen or fail to improve.    Aung Cornell MD  [facility] AND [facility]   Ant is a 26 year old who presents for the following health issues     HPI emergency department follow up for left ankle sprain.  He has mild pain now, able to fully weight bear.  Wants a note to go back to work.  Not needing any meds for the pain at all now. Uses just the ankle wrap. Emergency department notes reviewed as well as the x-ray report.  rarely now getting pains, just in the anterior tibia but nothing in the joint itself at rest and minimally in the joint with supination.           Review of Systems         Objective    BP (!) 140/72   Pulse 87   Temp 97.3  F (36.3  C)   Resp 14   Wt (!) 206.8 kg (456 lb)   SpO2 97%   BMI 54.07 kg/m    Body mass index is 54.07 kg/m .  Physical Exam  Constitutional:       Appearance: Normal appearance.   Musculoskeletal:      Comments: Left ankle with no pain on palpation, full range of motion and stable to varus and valgus stressing   Neurological:      Mental Status: He is alert.   Psychiatric:         Mood and Affect: Mood normal.         Behavior: Behavior normal.  "        Thought Content: Thought content normal.

## 2021-05-29 ASSESSMENT — ANXIETY QUESTIONNAIRES: GAD7 TOTAL SCORE: 0

## 2022-02-03 ENCOUNTER — HOSPITAL ENCOUNTER (EMERGENCY)
Facility: OTHER | Age: 27
Discharge: HOME | End: 2022-02-04
Attending: FAMILY MEDICINE | Admitting: FAMILY MEDICINE
Payer: COMMERCIAL

## 2022-02-03 DIAGNOSIS — E11.65 POORLY CONTROLLED TYPE 2 DIABETES MELLITUS (H): ICD-10-CM

## 2022-02-03 LAB
ALBUMIN SERPL-MCNC: 4.3 G/DL (ref 3.5–5.7)
ALBUMIN UR-MCNC: 50 MG/DL
ALP SERPL-CCNC: 107 U/L (ref 34–104)
ALT SERPL W P-5'-P-CCNC: 69 U/L (ref 7–52)
ANION GAP SERPL CALCULATED.3IONS-SCNC: 16 MMOL/L (ref 3–14)
APPEARANCE UR: CLEAR
AST SERPL W P-5'-P-CCNC: 31 U/L (ref 13–39)
BASOPHILS # BLD AUTO: 0 10E3/UL (ref 0–0.2)
BASOPHILS NFR BLD AUTO: 0 %
BILIRUB SERPL-MCNC: 0.6 MG/DL (ref 0.3–1)
BILIRUB UR QL STRIP: NEGATIVE
BUN SERPL-MCNC: 5 MG/DL (ref 7–25)
CALCIUM SERPL-MCNC: 9.4 MG/DL (ref 8.6–10.3)
CHLORIDE BLD-SCNC: 94 MMOL/L (ref 98–107)
CO2 SERPL-SCNC: 22 MMOL/L (ref 21–31)
COLOR UR AUTO: ABNORMAL
CREAT SERPL-MCNC: 0.5 MG/DL (ref 0.7–1.3)
EOSINOPHIL # BLD AUTO: 0.2 10E3/UL (ref 0–0.7)
EOSINOPHIL NFR BLD AUTO: 2 %
ERYTHROCYTE [DISTWIDTH] IN BLOOD BY AUTOMATED COUNT: 12.6 % (ref 10–15)
GFR SERPL CREATININE-BSD FRML MDRD: >90 ML/MIN/1.73M2
GLUCOSE BLD-MCNC: 337 MG/DL (ref 70–105)
GLUCOSE UR STRIP-MCNC: >1000 MG/DL
HCT VFR BLD AUTO: 40.2 % (ref 40–53)
HGB BLD-MCNC: 13.8 G/DL (ref 13.3–17.7)
HGB UR QL STRIP: NEGATIVE
IMM GRANULOCYTES # BLD: 0 10E3/UL
IMM GRANULOCYTES NFR BLD: 1 %
KETONES UR STRIP-MCNC: >150 MG/DL
LACTATE SERPL-SCNC: 0.8 MMOL/L (ref 0.7–2)
LEUKOCYTE ESTERASE UR QL STRIP: NEGATIVE
LYMPHOCYTES # BLD AUTO: 1.4 10E3/UL (ref 0.8–5.3)
LYMPHOCYTES NFR BLD AUTO: 16 %
MCH RBC QN AUTO: 28.5 PG (ref 26.5–33)
MCHC RBC AUTO-ENTMCNC: 34.3 G/DL (ref 31.5–36.5)
MCV RBC AUTO: 83 FL (ref 78–100)
MONOCYTES # BLD AUTO: 0.6 10E3/UL (ref 0–1.3)
MONOCYTES NFR BLD AUTO: 7 %
MUCOUS THREADS #/AREA URNS LPF: PRESENT /LPF
NEUTROPHILS # BLD AUTO: 6.6 10E3/UL (ref 1.6–8.3)
NEUTROPHILS NFR BLD AUTO: 74 %
NITRATE UR QL: NEGATIVE
NRBC # BLD AUTO: 0 10E3/UL
NRBC BLD AUTO-RTO: 0 /100
PH UR STRIP: 5.5 [PH] (ref 5–9)
PLATELET # BLD AUTO: 314 10E3/UL (ref 150–450)
POTASSIUM BLD-SCNC: 3.6 MMOL/L (ref 3.5–5.1)
PROT SERPL-MCNC: 7 G/DL (ref 6.4–8.9)
RBC # BLD AUTO: 4.85 10E6/UL (ref 4.4–5.9)
RBC URINE: 1 /HPF
SODIUM SERPL-SCNC: 132 MMOL/L (ref 134–144)
SP GR UR STRIP: 1.04 (ref 1–1.03)
UROBILINOGEN UR STRIP-MCNC: NORMAL MG/DL
WBC # BLD AUTO: 8.8 10E3/UL (ref 4–11)
WBC URINE: 2 /HPF

## 2022-02-03 PROCEDURE — 99284 EMERGENCY DEPT VISIT MOD MDM: CPT | Mod: 25 | Performed by: FAMILY MEDICINE

## 2022-02-03 PROCEDURE — 99283 EMERGENCY DEPT VISIT LOW MDM: CPT | Performed by: FAMILY MEDICINE

## 2022-02-03 PROCEDURE — 36415 COLL VENOUS BLD VENIPUNCTURE: CPT | Performed by: FAMILY MEDICINE

## 2022-02-03 PROCEDURE — 250N000012 HC RX MED GY IP 250 OP 636 PS 637: Performed by: FAMILY MEDICINE

## 2022-02-03 PROCEDURE — 80053 COMPREHEN METABOLIC PANEL: CPT | Performed by: FAMILY MEDICINE

## 2022-02-03 PROCEDURE — 85004 AUTOMATED DIFF WBC COUNT: CPT | Performed by: FAMILY MEDICINE

## 2022-02-03 PROCEDURE — 258N000003 HC RX IP 258 OP 636: Performed by: FAMILY MEDICINE

## 2022-02-03 PROCEDURE — 83605 ASSAY OF LACTIC ACID: CPT | Performed by: FAMILY MEDICINE

## 2022-02-03 PROCEDURE — 96374 THER/PROPH/DIAG INJ IV PUSH: CPT | Performed by: FAMILY MEDICINE

## 2022-02-03 PROCEDURE — 81001 URINALYSIS AUTO W/SCOPE: CPT | Performed by: FAMILY MEDICINE

## 2022-02-03 PROCEDURE — 96361 HYDRATE IV INFUSION ADD-ON: CPT | Performed by: FAMILY MEDICINE

## 2022-02-03 RX ORDER — SODIUM CHLORIDE 9 MG/ML
INJECTION, SOLUTION INTRAVENOUS CONTINUOUS
Status: DISCONTINUED | OUTPATIENT
Start: 2022-02-03 | End: 2022-02-04 | Stop reason: HOSPADM

## 2022-02-03 RX ADMIN — SODIUM CHLORIDE: 9 INJECTION, SOLUTION INTRAVENOUS at 23:13

## 2022-02-03 RX ADMIN — SODIUM CHLORIDE 1000 ML: 9 INJECTION, SOLUTION INTRAVENOUS at 22:05

## 2022-02-03 RX ADMIN — INSULIN HUMAN 10 UNITS: 100 INJECTION, SOLUTION PARENTERAL at 23:02

## 2022-02-03 ASSESSMENT — ENCOUNTER SYMPTOMS
FREQUENCY: 1
VOMITING: 1
NAUSEA: 1

## 2022-02-03 NOTE — LETTER
February 4, 2022      To Whom It May Concern:      Ant Tejeda was seen in our Emergency Department today, 02/04/22.  I expect his condition to improve over the next few days.  He may return to work when improved.    Sincerely,              Geovanni Mir MD

## 2022-02-04 VITALS
TEMPERATURE: 97.8 F | HEART RATE: 80 BPM | SYSTOLIC BLOOD PRESSURE: 144 MMHG | OXYGEN SATURATION: 98 % | BODY MASS INDEX: 49.67 KG/M2 | WEIGHT: 315 LBS | DIASTOLIC BLOOD PRESSURE: 88 MMHG | RESPIRATION RATE: 13 BRPM

## 2022-02-04 NOTE — ED TRIAGE NOTES
"ED Nursing Triage Note (General)   ________________________________    Ant Tejeda is a 26 year old Male that presents to triage private car  With history of  \"vomiting for 3 days, urinating a lot, drinking a lot of water\". Blood sugar at home 400's throughout the day. Took his metformin today as ordered.  Reports he was seen last weekend in [place] for high blood sugars (700's), given fluids and insulin, got him down to 400's and discharged him from the ER. Home Monday, hasn't felt well since. Takes only metformin. PCP in [employer] [facility]. This is reported by patient   Significant symptoms had onset 3 day(s) ago.  Temp 97.8  F (36.6  C) (Tympanic)   Wt (!) 190 kg (418 lb 14 oz)   BMI 49.67 kg/m  t  Patient appears alert  and oriented, in no acute distress., and cooperative, pleasant and calm behavior.    GCS Total = 15  Airway: intact  Breathing noted as Normal  Circulation Normal  Skin:  Normal        PRE [facility] LIVING SITUATION home  "

## 2022-02-04 NOTE — ED PROVIDER NOTES
History     Chief Complaint   Patient presents with     Hyperglycemia     The history is provided by the patient.     Ant Tejeda is a 26 year old male here with elevated blood sugar. He was in [place] last week and was seen in the ED there for elevated blood sugar of 724. They got his blood sugar down around 400 and he was discharged. Since coming home three days ago he has had persistently elevated blood sugar, is making lots of urine and has been vomiting.     He has a history of type 2 DM (on metformin), major depression with history of suicidal ideations (no meds) and obesity.  He was diagnosed with DM at age 22 yo and at that time weighed about 400 pounds. He was started on insulin. He lost weight and was able to get off insulin, and was treated with only metformin.  He has since gained his weight back- in [place] five days ago he weighed 420 pounds.     Allergies:  No Known Allergies    Problem List:    Patient Active Problem List    Diagnosis Date Noted     Morbid obesity (H) 05/28/2021     Priority: Medium     Major depressive disorder 08/26/2020     Priority: Medium        Past Medical History:    No past medical history on file.    Past Surgical History:    No past surgical history on file.    Family History:    No family history on file.    Social History:  Marital Status:  Single [1]  Social History     Tobacco Use     Smoking status: Current Every Day Smoker     Packs/day: 0.25     Years: 5.00     Pack years: 1.25     Types: Cigarettes     Smokeless tobacco: Never Used     Tobacco comment: Pt denies want of counseling    Substance Use Topics     Alcohol use: Not Currently     Comment: once a month     Drug use: Never        Medications:    metFORMIN (GLUCOPHAGE) 500 MG tablet          Review of Systems   Gastrointestinal: Positive for nausea and vomiting.   Genitourinary: Positive for frequency.   All other systems reviewed and are negative.      Physical Exam   BP: (!) 199/109  Pulse: 92  Temp:  97.8  F (36.6  C)  Resp: 16  Weight: (!) 190 kg (418 lb 14 oz)  SpO2: 97 %      Physical Exam  Vitals and nursing note reviewed.   Constitutional:       General: He is not in acute distress.     Appearance: He is obese. He is not ill-appearing.   Cardiovascular:      Rate and Rhythm: Normal rate and regular rhythm.      Pulses: Normal pulses.      Heart sounds: Normal heart sounds. No murmur heard.      Pulmonary:      Effort: Pulmonary effort is normal. No respiratory distress.      Breath sounds: Normal breath sounds. No wheezing or rales.   Abdominal:      General: Bowel sounds are normal.      Palpations: Abdomen is soft.      Tenderness: There is no abdominal tenderness.   Skin:     General: Skin is warm and dry.   Neurological:      General: No focal deficit present.      Mental Status: He is alert.   Psychiatric:         Mood and Affect: Mood normal.         Behavior: Behavior normal.         Results for orders placed or performed during the hospital encounter of 02/03/22 (from the past 24 hour(s))   UA with Microscopic reflex to Culture    Specimen: Urine, Midstream   Result Value Ref Range    Color Urine Light Yellow Colorless, Straw, Light Yellow, Yellow    Appearance Urine Clear Clear    Glucose Urine >1000 (A) Negative mg/dL    Bilirubin Urine Negative Negative    Ketones Urine >150 (A) Negative mg/dL    Specific Gravity Urine 1.043 (H) 1.000 - 1.030    Blood Urine Negative Negative    pH Urine 5.5 5.0 - 9.0    Protein Albumin Urine 50  (A) Negative mg/dL    Urobilinogen Urine Normal Normal, 2.0 mg/dL    Nitrite Urine Negative Negative    Leukocyte Esterase Urine Negative Negative    Mucus Urine Present (A) None Seen /LPF    RBC Urine 1 <=2 /HPF    WBC Urine 2 <=5 /HPF    Narrative    Urine Culture not indicated   CBC with platelets differential    Narrative    The following orders were created for panel order CBC with platelets differential.  Procedure                               Abnormality          Status                     ---------                               -----------         ------                     CBC with platelets and d...[[id number]]                      Final result                 Please view results for these tests on the individual orders.   Comprehensive metabolic panel   Result Value Ref Range    Sodium 132 (L) 134 - 144 mmol/L    Potassium 3.6 3.5 - 5.1 mmol/L    Chloride 94 (L) 98 - 107 mmol/L    Carbon Dioxide (CO2) 22 21 - 31 mmol/L    Anion Gap 16 (H) 3 - 14 mmol/L    Urea Nitrogen 5 (L) 7 - 25 mg/dL    Creatinine 0.50 (L) 0.70 - 1.30 mg/dL    Calcium 9.4 8.6 - 10.3 mg/dL    Glucose 337 (H) 70 - 105 mg/dL    Alkaline Phosphatase 107 (H) 34 - 104 U/L    AST 31 13 - 39 U/L    ALT 69 (H) 7 - 52 U/L    Protein Total 7.0 6.4 - 8.9 g/dL    Albumin 4.3 3.5 - 5.7 g/dL    Bilirubin Total 0.6 0.3 - 1.0 mg/dL    GFR Estimate >90 >60 mL/min/1.73m2   Lactic acid whole blood   Result Value Ref Range    Lactic Acid 0.8 0.7 - 2.0 mmol/L   CBC with platelets and differential   Result Value Ref Range    WBC Count 8.8 4.0 - 11.0 10e3/uL    RBC Count 4.85 4.40 - 5.90 10e6/uL    Hemoglobin 13.8 13.3 - 17.7 g/dL    Hematocrit 40.2 40.0 - 53.0 %    MCV 83 78 - 100 fL    MCH 28.5 26.5 - 33.0 pg    MCHC 34.3 31.5 - 36.5 g/dL    RDW 12.6 10.0 - 15.0 %    Platelet Count 314 150 - 450 10e3/uL    % Neutrophils 74 %    % Lymphocytes 16 %    % Monocytes 7 %    % Eosinophils 2 %    % Basophils 0 %    % Immature Granulocytes 1 %    NRBCs per 100 WBC 0 <1 /100    Absolute Neutrophils 6.6 1.6 - 8.3 10e3/uL    Absolute Lymphocytes 1.4 0.8 - 5.3 10e3/uL    Absolute Monocytes 0.6 0.0 - 1.3 10e3/uL    Absolute Eosinophils 0.2 0.0 - 0.7 10e3/uL    Absolute Basophils 0.0 0.0 - 0.2 10e3/uL    Absolute Immature Granulocytes 0.0 <=0.4 10e3/uL    Absolute NRBCs 0.0 10e3/uL       Medications   0.9% sodium chloride BOLUS (0 mLs Intravenous Stopped 2/3/22 6710)     Followed by   sodium chloride 0.9% infusion ( Intravenous New Bag  2/3/22 2313)   insulin (regular) (HumuLIN R/NovoLIN R) injection 10 Units (10 Units Intravenous Given 2/3/22 2302)       Assessments & Plan (with Medical Decision Making)  Ant Tejeda is a 26 year old male here with elevated blood sugar. He was in [place] last week and was seen in the ED there for elevated blood sugar of 724. They got his blood sugar down around 400 and he was discharged. Since coming home three days ago he has had persistently elevated blood sugar, is making lots of urine and has been vomiting.  He has a history of type 2 DM (on metformin), major depression with history of suicidal ideations (no meds) and obesity.  He was diagnosed with DM at age 22 yo and at that time weighed about 400 pounds. He was started on insulin. He lost weight and was able to get off insulin, and was treated with only metformin.  He has since gained his weight back- in [place] five days ago he weighed 420 pounds.  VS in the ED BP (!) 199/109   Pulse 92   Temp 97.8  F (36.6  C) (Tympanic)   Resp 16   Wt (!) 190 kg (418 lb 14 oz)   SpO2 97%   BMI 49.67 kg/m    Exam shows a morbidly obese 27 yo male who appears in no distress. Heart and lungs sound normal.  We started an IV and gave IV fluids.  Labs show CBC normal, CMP shows Na 132, Cl 94, glucose 337, alk phos 107, lactic acid normal, UA has >150 ketones and >1000 glucose. We gave 10 U regular insulin in the IV.  I think he is safe to leave the ED and follow up in [facility]. He may need to be back on insulin.     I have reviewed the nursing notes.    I have reviewed the findings, diagnosis, plan and need for follow up with the patient.       New Prescriptions    No medications on file       Final diagnoses:   Poorly controlled type 2 diabetes mellitus (H)       2/3/2022   [facility]  02/03/22 4816

## 2022-02-04 NOTE — DISCHARGE INSTRUCTIONS
Ant    Follow up with your physician about this. You are stable to leave the ED this evening but I think it is likely that you will end up back on insulin.    Thank you for choosing our Emergency Department for your care.     You may receive a phone call or letter for a survey about your care in our ED.  Please complete this as this is how we improve care for our patients.     If you have any questions after leaving the ED you can call me at [phone]. I am working 7 PM to 7 AM tonight through Friday night.     Sincerely,    Dr Rigo Mir M.D.

## 2022-04-04 ENCOUNTER — HOSPITAL ENCOUNTER (EMERGENCY)
Facility: OTHER | Age: 27
Discharge: HOME | End: 2022-04-04
Attending: EMERGENCY MEDICINE | Admitting: EMERGENCY MEDICINE
Payer: COMMERCIAL

## 2022-04-04 VITALS
DIASTOLIC BLOOD PRESSURE: 105 MMHG | HEART RATE: 71 BPM | RESPIRATION RATE: 18 BRPM | TEMPERATURE: 98.2 F | OXYGEN SATURATION: 97 % | SYSTOLIC BLOOD PRESSURE: 181 MMHG

## 2022-04-04 DIAGNOSIS — R39.11 URINARY HESITANCY: ICD-10-CM

## 2022-04-04 LAB
ALBUMIN UR-MCNC: NEGATIVE MG/DL
AMPHETAMINES UR QL: NOT DETECTED
APPEARANCE UR: CLEAR
BARBITURATES UR QL SCN: NOT DETECTED
BENZODIAZ UR QL SCN: NOT DETECTED
BILIRUB UR QL STRIP: NEGATIVE
BUPRENORPHINE UR QL: NOT DETECTED
C TRACH DNA SPEC QL PROBE+SIG AMP: NEGATIVE
CANNABINOIDS UR QL: NOT DETECTED
COCAINE UR QL SCN: NOT DETECTED
COLOR UR AUTO: YELLOW
D-METHAMPHET UR QL: NOT DETECTED
GLUCOSE UR STRIP-MCNC: NEGATIVE MG/DL
HGB UR QL STRIP: NEGATIVE
KETONES UR STRIP-MCNC: 10 MG/DL
LEUKOCYTE ESTERASE UR QL STRIP: NEGATIVE
METHADONE UR QL SCN: NOT DETECTED
N GONORRHOEA DNA SPEC QL NAA+PROBE: NEGATIVE
NITRATE UR QL: NEGATIVE
OPIATES UR QL SCN: NOT DETECTED
OXYCODONE UR QL SCN: NOT DETECTED
PCP UR QL SCN: NOT DETECTED
PH UR STRIP: 6.5 [PH] (ref 5–9)
PROPOXYPH UR QL: NOT DETECTED
RBC URINE: 0 /HPF
SP GR UR STRIP: 1 (ref 1–1.03)
TRICYCLICS UR QL SCN: NOT DETECTED
UROBILINOGEN UR STRIP-MCNC: NORMAL MG/DL
WBC URINE: <1 /HPF

## 2022-04-04 PROCEDURE — 250N000013 HC RX MED GY IP 250 OP 250 PS 637: Performed by: EMERGENCY MEDICINE

## 2022-04-04 PROCEDURE — 87491 CHLMYD TRACH DNA AMP PROBE: CPT | Performed by: EMERGENCY MEDICINE

## 2022-04-04 PROCEDURE — 81001 URINALYSIS AUTO W/SCOPE: CPT | Performed by: EMERGENCY MEDICINE

## 2022-04-04 PROCEDURE — 99283 EMERGENCY DEPT VISIT LOW MDM: CPT | Performed by: EMERGENCY MEDICINE

## 2022-04-04 PROCEDURE — 99282 EMERGENCY DEPT VISIT SF MDM: CPT | Performed by: EMERGENCY MEDICINE

## 2022-04-04 PROCEDURE — 80306 DRUG TEST PRSMV INSTRMNT: CPT | Performed by: EMERGENCY MEDICINE

## 2022-04-04 RX ORDER — HYDROXYZINE PAMOATE 25 MG/1
25 CAPSULE ORAL ONCE
Status: COMPLETED | OUTPATIENT
Start: 2022-04-04 | End: 2022-04-04

## 2022-04-04 RX ADMIN — HYDROXYZINE PAMOATE 25 MG: 25 CAPSULE ORAL at 21:31

## 2022-04-05 NOTE — ED TRIAGE NOTES
"Pt presents to ER complaining of abdominal/ lower back pain for 4-5 days.  Complains of painful urination and always \"Feels like I have to pee\".  Denies SOB/ chest pain.    Sharon Hall RN.............................4/4/2022 9:28 PM        "

## 2022-04-05 NOTE — ED PROVIDER NOTES
[facility]  Emergency Department Visit Note    Chief Complaint   Patient presents with     UTI       History of Present Illness     HPI:  Ant Tejeda is a 26 year old old male presenting to the Emergency Department today for evaluation of urgency and dyuria.  He has not had a history of frequent urinary tract infections in the past. Symptoms began 4-5 days.  He denies flank pain, hematuria, fever, chills, nausea, vomiting, and abnormal discharge.    Medications:  Prior to Admission medications    Medication Sig Last Dose Taking? Auth Provider   metFORMIN (GLUCOPHAGE) 500 MG tablet Take 1,000 mg by mouth 2 times daily (with meals)   Reported, Patient       Allergies:  No Known Allergies    Problem List:  Patient Active Problem List   Diagnosis     Major depressive disorder     Morbid obesity (H)       Past Medical History:  No past medical history on file.    Past Surgical History:  No past surgical history on file.    Social History:  Social History     Tobacco Use     Smoking status: Current Every Day Smoker     Packs/day: 0.25     Years: 5.00     Pack years: 1.25     Types: Cigarettes     Smokeless tobacco: Never Used     Tobacco comment: Pt denies want of counseling    Substance Use Topics     Alcohol use: Not Currently     Comment: once a month     Drug use: Never       Review of Systems:  See HPI.      Physical Exam     Vital signs: BP (!) 181/105   Pulse 71   Temp 98.2  F (36.8  C) (Temporal)   Resp 18   SpO2 97%     Physical Exam:    General: awake, alert, comfortable  HEENT: no scleral injection, no nasal discharge, neck supple  Chest: non labored respirations, symmetric chest rise, no accessory muscle use  Cardiovascular: regular rate, regular rhythm, distally capillary refill intact  Abdomen: soft, nontender, no rebound or guarding, nondistended, no CVA tenderness bilaterally  Extremities: no deformities, edema, or cyanosis  Skin: warm, dry, no rashes  Neuro: alert, moving extremities x  4, ambulates without difficulty      Medical Decision Making & ED Course     Patient presents to the Emergency Department for evaluation of urinary symptoms. Differential includes urinary tract infection, hemorrhagic cystitis, pyelonephritis, urolithiasis, sexually transmitted disease. Urinalysis does not suggest infection. Symptoms and examination findings are not concerning for pyelonephritis or infected urolithiasis. Sexually transmitted disease testing iis done and pending.  He is stable for further outpatient management. Patient given instructions on follow-up and warning signs for which to return to emergency department, including worsening symptoms, fever, back pain, vomiting, or other concerns. All questions were answered and the patient is comfortable with plan for discharge. The patient was discharged in stable condition.     Diagnosis & Disposition     Diagnosis:  1. Urinary hesitancy        Disposition:  Home    MD Khang Benavidez Theresa M, MD  04/05/22 0929

## 2022-04-09 ENCOUNTER — HOSPITAL ENCOUNTER (EMERGENCY)
Facility: OTHER | Age: 27
Discharge: HOME | End: 2022-04-09
Attending: FAMILY MEDICINE | Admitting: FAMILY MEDICINE
Payer: COMMERCIAL

## 2022-04-09 VITALS
SYSTOLIC BLOOD PRESSURE: 142 MMHG | WEIGHT: 315 LBS | HEART RATE: 90 BPM | TEMPERATURE: 97.6 F | DIASTOLIC BLOOD PRESSURE: 81 MMHG | OXYGEN SATURATION: 98 % | RESPIRATION RATE: 17 BRPM | BODY MASS INDEX: 37.19 KG/M2 | HEIGHT: 77 IN

## 2022-04-09 DIAGNOSIS — E11.9 TYPE 2 DIABETES MELLITUS WITHOUT COMPLICATION, WITHOUT LONG-TERM CURRENT USE OF INSULIN (H): ICD-10-CM

## 2022-04-09 DIAGNOSIS — F33.1 MODERATE EPISODE OF RECURRENT MAJOR DEPRESSIVE DISORDER (H): ICD-10-CM

## 2022-04-09 DIAGNOSIS — R42 DIZZINESS: ICD-10-CM

## 2022-04-09 DIAGNOSIS — F41.9 ANXIETY: ICD-10-CM

## 2022-04-09 LAB
ANION GAP SERPL CALCULATED.3IONS-SCNC: 8 MMOL/L (ref 3–14)
B-OH-BUTYR SERPL-MCNC: 0.1 MMOL/L (ref 0–0.6)
BASE EXCESS BLDV CALC-SCNC: 1.1 MMOL/L (ref -7.7–1.9)
BASOPHILS # BLD AUTO: 0 10E3/UL (ref 0–0.2)
BASOPHILS NFR BLD AUTO: 0 %
BUN SERPL-MCNC: 9 MG/DL (ref 7–25)
CALCIUM SERPL-MCNC: 9.6 MG/DL (ref 8.6–10.3)
CHLORIDE BLD-SCNC: 103 MMOL/L (ref 98–107)
CO2 SERPL-SCNC: 26 MMOL/L (ref 21–31)
CREAT SERPL-MCNC: 0.62 MG/DL (ref 0.7–1.3)
EOSINOPHIL # BLD AUTO: 0.1 10E3/UL (ref 0–0.7)
EOSINOPHIL NFR BLD AUTO: 1 %
ERYTHROCYTE [DISTWIDTH] IN BLOOD BY AUTOMATED COUNT: 13.1 % (ref 10–15)
GFR SERPL CREATININE-BSD FRML MDRD: >90 ML/MIN/1.73M2
GLUCOSE BLD-MCNC: 132 MG/DL (ref 70–105)
GLUCOSE BLDC GLUCOMTR-MCNC: 156 MG/DL (ref 70–99)
HCO3 BLDV-SCNC: 27 MMOL/L (ref 21–28)
HCT VFR BLD AUTO: 44.1 % (ref 40–53)
HGB BLD-MCNC: 14.5 G/DL (ref 13.3–17.7)
IMM GRANULOCYTES # BLD: 0 10E3/UL
IMM GRANULOCYTES NFR BLD: 0 %
LYMPHOCYTES # BLD AUTO: 1.7 10E3/UL (ref 0.8–5.3)
LYMPHOCYTES NFR BLD AUTO: 19 %
MCH RBC QN AUTO: 28.2 PG (ref 26.5–33)
MCHC RBC AUTO-ENTMCNC: 32.9 G/DL (ref 31.5–36.5)
MCV RBC AUTO: 86 FL (ref 78–100)
MONOCYTES # BLD AUTO: 0.5 10E3/UL (ref 0–1.3)
MONOCYTES NFR BLD AUTO: 6 %
NEUTROPHILS # BLD AUTO: 6.5 10E3/UL (ref 1.6–8.3)
NEUTROPHILS NFR BLD AUTO: 74 %
NRBC # BLD AUTO: 0 10E3/UL
NRBC BLD AUTO-RTO: 0 /100
O2/TOTAL GAS SETTING VFR VENT: 0 %
OXYHGB MFR BLDV: 69 % (ref 70–75)
PCO2 BLDV: 47 MM HG (ref 40–50)
PH BLDV: 7.37 [PH] (ref 7.32–7.43)
PLATELET # BLD AUTO: 398 10E3/UL (ref 150–450)
PO2 BLDV: 37 MM HG (ref 25–47)
POTASSIUM BLD-SCNC: 3.7 MMOL/L (ref 3.5–5.1)
RBC # BLD AUTO: 5.14 10E6/UL (ref 4.4–5.9)
SODIUM SERPL-SCNC: 137 MMOL/L (ref 134–144)
TROPONIN I SERPL-MCNC: 2.4 PG/ML (ref 0–34)
WBC # BLD AUTO: 8.9 10E3/UL (ref 4–11)

## 2022-04-09 PROCEDURE — 99285 EMERGENCY DEPT VISIT HI MDM: CPT | Mod: 25 | Performed by: FAMILY MEDICINE

## 2022-04-09 PROCEDURE — 84132 ASSAY OF SERUM POTASSIUM: CPT | Performed by: FAMILY MEDICINE

## 2022-04-09 PROCEDURE — 99284 EMERGENCY DEPT VISIT MOD MDM: CPT | Performed by: FAMILY MEDICINE

## 2022-04-09 PROCEDURE — 84484 ASSAY OF TROPONIN QUANT: CPT | Performed by: FAMILY MEDICINE

## 2022-04-09 PROCEDURE — 258N000003 HC RX IP 258 OP 636: Performed by: FAMILY MEDICINE

## 2022-04-09 PROCEDURE — 85025 COMPLETE CBC W/AUTO DIFF WBC: CPT | Performed by: FAMILY MEDICINE

## 2022-04-09 PROCEDURE — 36415 COLL VENOUS BLD VENIPUNCTURE: CPT | Performed by: FAMILY MEDICINE

## 2022-04-09 PROCEDURE — 93010 ELECTROCARDIOGRAM REPORT: CPT | Performed by: INTERNAL MEDICINE

## 2022-04-09 PROCEDURE — 96360 HYDRATION IV INFUSION INIT: CPT | Performed by: FAMILY MEDICINE

## 2022-04-09 PROCEDURE — 82805 BLOOD GASES W/O2 SATURATION: CPT | Performed by: FAMILY MEDICINE

## 2022-04-09 PROCEDURE — 82010 KETONE BODYS QUAN: CPT | Performed by: FAMILY MEDICINE

## 2022-04-09 PROCEDURE — 93005 ELECTROCARDIOGRAM TRACING: CPT | Performed by: FAMILY MEDICINE

## 2022-04-09 RX ADMIN — SODIUM CHLORIDE 1000 ML: 9 INJECTION, SOLUTION INTRAVENOUS at 16:19

## 2022-04-09 ASSESSMENT — ENCOUNTER SYMPTOMS
EYES NEGATIVE: 1
NERVOUS/ANXIOUS: 1
NAUSEA: 0
CHEST TIGHTNESS: 1
DIARRHEA: 0
PALPITATIONS: 0
CONSTIPATION: 1
DECREASED CONCENTRATION: 1
CONSTITUTIONAL NEGATIVE: 1
BACK PAIN: 0
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
## 2022-04-09 NOTE — DISCHARGE INSTRUCTIONS
DEC Aftercare Plan  If I am feeling unsafe or I am in a crisis, I will:   Contact my established care providers   Call the National Suicide Prevention Lifeline: 678.520.5808   Go to the nearest emergency room   Call 911     Warning signs that I or other people might notice when a crisis is developing for me:   Thoughts of suicidal or physical self harm  Increase in anxiety and/or panic attacks  Increase in difficulty managing day to day tasks    Things I am able to do on my own to cope or help me feel better:   Attend individual therapy for additional support  Utilize distraction methods/grounding exercises   Find a new hobby or interest      Things that I am able to do with others to cope or help me feel better:   Ask for help as needed  Share thoughts and feelings with trusted individual      People in my life that I can ask for help:   Mom  Close friend  Co-workers  [facility] crisis workers     Your [facility] has a mental health crisis team you can call 24/7: [facility] partners with you local crisis response team (CRT) to provide crisis follow up after you leave the hospital. Someone from the CRT team will be reaching out to you via phone, or will schedule an in person visit as appropriate. You can also reach CRT by calling [phone]. Clients in the [facility] ([place], [place], [place], [place], [place], [facility] and [facility]) can dial 211 for immediate assistance. Their website is [email or web address]      Appointment information and/or additional resources available to me:     Coping skills for anxiety;     Stop and Breathe     When anxiety flares, take a time out and think about what it is that is making you so nervous. Anxiety is typically experienced as worrying about a future or past event.     For example, you may be worried that something bad is going to happen in the future. Perhaps you continually feel upset over an event  that has already occurred. Regardless of what you are worried about, a big part of the problem is that you are not being mindful of the present moment.     Anxiety loses its  when you clear your mind of worry and bring your awareness back to the present.     The next time your anxiety starts to take you out of the present, regain control by sitting down and taking a few deep breaths. Simply stopping and breathing can help restore a sense of personal balance and bring you back to the present moment. However, if you have the time, try taking this activity a little further and experiment with a breathing exercise and mantra.          Figure Out What's Bothering You     The physical symptoms of panic and anxiety, such as trembling, chest pain, and rapid heartbeat, are usually more apparent than understanding just what is making you anxious. However, in order to get to the root of your anxiety, you need to figure out what s bothering you. To get to the bottom of your anxiety, put some time aside to exploring your thoughts and feelings.     Writing in a journal can be a great way to get in touch with your sources of anxiety. If anxious feelings seem to be keeping you up at night, try keeping a journal or notepad next to your bed. Write down all of the things that are bothering you. Talking with a friend can be another way to discover and understand your anxious feelings.1?     Make it a habit to regularly uncover and express your feelings of anxiety.          Focus On What You Can Change     Many times anxiety stems from fearing things that haven t even happened and may never occur. For example, even though everything is okay, you may still worry about potential issues, such as losing your job, becoming ill, or the safety of your loved ones.     Life can be unpredictable and no matter how hard you try, you can t always control what happens. However, you can decide how you are going to deal with the unknown. You can  turn your anxiety into a source of strength by letting go of fear and focusing on gratitude.          Replace your fears by changing your attitude about them. For example, stop fearing to lose your job and instead focus on how grateful you are to have a job. Come to work determined to do your best. Instead of fearing your loved one's safety, spend time with them, or express your appreciation of them. With a little practice, you can learn to dump your anxiety and  a more positive outlook.     At times, your anxiety may actually be caused by a real circumstance in your life. Perhaps you re in a situation where it is realistic to be worried about losing your job due to high company layoffs or talks of downsizing.     When anxiety is identified as being caused by a current problem, then taking action may be the answer to reducing your anxiety.4? For example, you may need to start job searching or scheduling interviews after work.     By being more proactive, you can feel like you have a bit more control over your situation.          Focus on Something Less Anxiety-Provoking     At times, it may be most helpful to simply redirect yourself to focus on something other than your anxiety. You may want to reach out to others, do some work around your home, or engage in an enjoyable activity or hobby. Here are a few ideas of things you can do to thwart off anxiety:     Do some chores or organizing around the house.     Engage in a creative activity, such as drawing, painting, or writing.     Go for a ?walk or engage in some other form of physical exercise.     Listen to music.     Thomson or meditate.     Read a good book or watch a funny movie.     Most people are familiar with experiencing some anxiety from time-to-time. However, chronic anxiety can be a sign of a diagnosable anxiety disorder.     When anxiety affects one s relationships, work performance, and other areas of life, there is potential that these anxious  "feelings are actually an indication of mental health illness.     If you are experiencing anxiety and panic symptoms, talk with your doctor or other professionals who treat panic disorder. They will be able to address any concerns you have, provide information on diagnosis, and discuss your treatment options.     Crisis Lines  Crisis Text Line  Text 019681  You will be connected with a trained live crisis counselor to provide support.    Por espanol, texto  TRUE a 099361 o texto a 442-AYUDAME en WhatsApp    National Hope Line  1.800.SUICIDE [6400840]      Community Resources  Fast Tracker  Linking people to mental health and substance use disorder resources  MediWound.Syncano     [place] Mental Health Warm Line  Peer to peer support  Monday thru Saturday, 12 pm to 10 pm  736.163.1413 or 8.727.951.6702  Text \"Support\" to 75502    National [place] on Mental Illness (ALESIA)  887.717.9403 or 1.888.ALESIA.HELPS        Mental Health Apps  My3  https://Scribd.org/    VirtualHopeBox  https://MediSwipe.org/apps/virtual-hope-box/      Additional Information  Today you were seen by a licensed mental health professional through Triage and Transition services, Behavioral Healthcare Providers ([facility])  for a crisis assessment in the Emergency Department at [facility].  It is recommended that you follow up with your established providers (psychiatrist, mental health therapist, and/or primary care doctor - as relevant) as soon as possible. Coordinators from [facility] will be calling you in the next 24-48 hours to ensure that you have the resources you need.  You can also contact [facility] coordinators directly at [phone]. You may have been scheduled for or offered an appointment with a mental health provider. [facility] maintains an extensive network of licensed behavioral health providers to connect patients with the services they need.  We do not charge providers a fee to participate in our referral network.  We match " patients with providers based on a patient's specific needs, insurance coverage, and location.  Our first effort will be to refer you to a provider within your care system, and will utilize providers outside your care system as needed.

## 2022-04-09 NOTE — ED TRIAGE NOTES
"ED Nursing Triage Note (General)   ________________________________    Ant Tejeda is a 26 year old Male that presents to triage private car  With history of  Dizziness and then feeling congestion and then chest pressure  reported by patient   Significant symptoms had onset today at work  BP (!) 142/81   Pulse 90   Temp 97.6  F (36.4  C)   Resp 17   Ht 1.956 m (6' 5\")   Wt (!) 186.4 kg (410 lb 15 oz)   SpO2 98%   BMI 48.73 kg/m  t  Patient appears alert  and oriented, in mild distress., and cooperative behavior.  Depression: has thoughts of suicide but not a plan in place   GCS Total = 15  Airway: intact  Breathing noted as Normal  Circulation Normal  Skin:  Normal  Action taken:  Triage to critical care immediately      PRE HOSPITAL PRIOR LIVING SITUATION Alone  "

## 2022-04-12 LAB
ATRIAL RATE - MUSE: 67 BPM
DIASTOLIC BLOOD PRESSURE - MUSE: NORMAL MMHG
INTERPRETATION ECG - MUSE: NORMAL
P AXIS - MUSE: 28 DEGREES
PR INTERVAL - MUSE: 184 MS
QRS DURATION - MUSE: 112 MS
QT - MUSE: 404 MS
QTC - MUSE: 426 MS
R AXIS - MUSE: 24 DEGREES
SYSTOLIC BLOOD PRESSURE - MUSE: NORMAL MMHG
T AXIS - MUSE: 18 DEGREES
VENTRICULAR RATE- MUSE: 67 BPM

## 2022-07-28 ENCOUNTER — HOSPITAL ENCOUNTER (EMERGENCY)
Facility: OTHER | Age: 27
Discharge: HOME | End: 2022-07-28
Attending: PHYSICIAN ASSISTANT | Admitting: PHYSICIAN ASSISTANT
Payer: COMMERCIAL

## 2022-07-28 VITALS
WEIGHT: 315 LBS | SYSTOLIC BLOOD PRESSURE: 132 MMHG | TEMPERATURE: 97.6 F | BODY MASS INDEX: 36.45 KG/M2 | DIASTOLIC BLOOD PRESSURE: 90 MMHG | RESPIRATION RATE: 16 BRPM | HEART RATE: 111 BPM | OXYGEN SATURATION: 96 % | HEIGHT: 78 IN

## 2022-07-28 DIAGNOSIS — R59.0 ANTERIOR CERVICAL ADENOPATHY: ICD-10-CM

## 2022-07-28 DIAGNOSIS — B96.89 ACUTE BACTERIAL PHARYNGITIS: ICD-10-CM

## 2022-07-28 DIAGNOSIS — J02.8 ACUTE BACTERIAL PHARYNGITIS: ICD-10-CM

## 2022-07-28 LAB
FLUAV RNA SPEC QL NAA+PROBE: NEGATIVE
FLUBV RNA RESP QL NAA+PROBE: NEGATIVE
GROUP A STREP BY PCR: NOT DETECTED
RSV RNA SPEC NAA+PROBE: NEGATIVE
SARS-COV-2 RNA RESP QL NAA+PROBE: NEGATIVE

## 2022-07-28 PROCEDURE — 250N000013 HC RX MED GY IP 250 OP 250 PS 637: Performed by: EMERGENCY MEDICINE

## 2022-07-28 PROCEDURE — 99284 EMERGENCY DEPT VISIT MOD MDM: CPT | Mod: CS | Performed by: PHYSICIAN ASSISTANT

## 2022-07-28 PROCEDURE — 87637 SARSCOV2&INF A&B&RSV AMP PRB: CPT | Performed by: EMERGENCY MEDICINE

## 2022-07-28 PROCEDURE — 87651 STREP A DNA AMP PROBE: CPT | Performed by: EMERGENCY MEDICINE

## 2022-07-28 PROCEDURE — 96372 THER/PROPH/DIAG INJ SC/IM: CPT | Performed by: PHYSICIAN ASSISTANT

## 2022-07-28 PROCEDURE — 99283 EMERGENCY DEPT VISIT LOW MDM: CPT | Mod: CS | Performed by: PHYSICIAN ASSISTANT

## 2022-07-28 PROCEDURE — 250N000011 HC RX IP 250 OP 636: Performed by: EMERGENCY MEDICINE

## 2022-07-28 PROCEDURE — 250N000011 HC RX IP 250 OP 636: Performed by: PHYSICIAN ASSISTANT

## 2022-07-28 PROCEDURE — C9803 HOPD COVID-19 SPEC COLLECT: HCPCS | Performed by: PHYSICIAN ASSISTANT

## 2022-07-28 PROCEDURE — 96372 THER/PROPH/DIAG INJ SC/IM: CPT | Performed by: EMERGENCY MEDICINE

## 2022-07-28 RX ORDER — KETOROLAC TROMETHAMINE 15 MG/ML
15 INJECTION, SOLUTION INTRAMUSCULAR; INTRAVENOUS ONCE
Status: COMPLETED | OUTPATIENT
Start: 2022-07-28 | End: 2022-07-28

## 2022-07-28 RX ORDER — CEFTRIAXONE SODIUM 1 G
1 VIAL (EA) INJECTION ONCE
Status: COMPLETED | OUTPATIENT
Start: 2022-07-28 | End: 2022-07-28

## 2022-07-28 RX ORDER — CYCLOBENZAPRINE HCL 10 MG
10 TABLET ORAL ONCE
Status: COMPLETED | OUTPATIENT
Start: 2022-07-28 | End: 2022-07-28

## 2022-07-28 RX ORDER — KETOROLAC TROMETHAMINE 15 MG/ML
15 INJECTION, SOLUTION INTRAMUSCULAR; INTRAVENOUS ONCE
Status: DISCONTINUED | OUTPATIENT
Start: 2022-07-28 | End: 2022-07-28

## 2022-07-28 RX ADMIN — KETOROLAC TROMETHAMINE 15 MG: 15 INJECTION, SOLUTION INTRAMUSCULAR; INTRAVENOUS at 23:09

## 2022-07-28 RX ADMIN — CYCLOBENZAPRINE 10 MG: 10 TABLET, FILM COATED ORAL at 23:09

## 2022-07-28 RX ADMIN — CEFTRIAXONE SODIUM 1 G: 1 INJECTION, POWDER, FOR SOLUTION INTRAMUSCULAR; INTRAVENOUS at 22:39

## 2022-07-28 ASSESSMENT — ENCOUNTER SYMPTOMS
STRIDOR: 0
CONFUSION: 0
SEIZURES: 0
BACK PAIN: 0
FEVER: 1
AGITATION: 0
DIARRHEA: 0
ACTIVITY CHANGE: 1
FACIAL SWELLING: 0
FLANK PAIN: 0
FATIGUE: 1
SORE THROAT: 1
CHILLS: 1
FACIAL ASYMMETRY: 0
ABDOMINAL PAIN: 0
WEAKNESS: 1
VOMITING: 0
CONSTIPATION: 0
MYALGIAS: 1
EYE PAIN: 0
NAUSEA: 0
TREMORS: 0
COUGH: 1

## 2022-07-28 NOTE — LETTER
July 28, 2022      To Whom It May Concern:      Ant Tejeda was seen in our Emergency Department today, 07/28/22.  This patient has been sick over the past 3 days with symptoms that started on 7/25/2022.  He was seen today and he continues to have bacterial pharyngitis he will need the next 3 days off as well but can return to work on Monday, 8/1/2022.  He may return sooner if his symptoms improve considerably.      Sincerely,                        Kin Salmon, MPA,  PA-C

## 2022-07-29 NOTE — ED TRIAGE NOTES
Pt presents to ED with low back pain and sore throat.  Pt stated he has missed 3 days of work and needed to get checked out.    Triage Assessment     Row Name 07/28/22 2132       Triage Assessment (Adult)    Airway WDL WDL       Respiratory WDL    Respiratory WDL WDL       Skin Circulation/Temperature WDL    Skin Circulation/Temperature WDL WDL       Cardiac WDL    Cardiac WDL WDL       Peripheral/Neurovascular WDL    Peripheral Neurovascular WDL WDL       Cognitive/Neuro/Behavioral WDL    Cognitive/Neuro/Behavioral WDL WDL

## 2022-07-29 NOTE — ED NOTES
Pt verbalizes understanding with discharge instructions. C/o back pain medication given pt does not want to wait to discharge for med to work, ok to go home

## 2022-07-29 NOTE — ED PROVIDER NOTES
History     Chief Complaint   Patient presents with     Pharyngitis     Back Pain     HPI  Ant Tejeda is a 27 year old male who reports that he has missed 3 days of work because he feels he has been sick.  He has had a sore throat and body aches.  He has had fever and chills.  He is complaining of some low back pain as well.  He has not been vaccinated for COVID    Allergies:  No Known Allergies    Problem List:    Patient Active Problem List    Diagnosis Date Noted     Morbid obesity (H) 05/28/2021     Priority: Medium     Major depressive disorder 08/26/2020     Priority: Medium        Past Medical History:    No past medical history on file.    Past Surgical History:    No past surgical history on file.    Family History:    No family history on file.    Social History:  Marital Status:  Single [1]  Social History     Tobacco Use     Smoking status: Current Every Day Smoker     Packs/day: 0.25     Years: 5.00     Pack years: 1.25     Types: Cigarettes     Smokeless tobacco: Never Used     Tobacco comment: Pt denies want of counseling    Substance Use Topics     Alcohol use: Not Currently     Comment: once a month     Drug use: Yes     Frequency: 70.0 times per week     Types: Opiates     Comment: less than 10 a day        Medications:    metFORMIN (GLUCOPHAGE) 500 MG tablet          Review of Systems   Constitutional: Positive for activity change, chills, fatigue and fever.   HENT: Positive for sore throat. Negative for drooling and facial swelling.    Eyes: Negative for pain and visual disturbance.   Respiratory: Positive for cough. Negative for stridor.    Cardiovascular: Negative for chest pain.   Gastrointestinal: Negative for abdominal pain, constipation, diarrhea, nausea and vomiting.   Genitourinary: Negative for flank pain.   Musculoskeletal: Positive for myalgias. Negative for back pain.   Skin: Negative for pallor.   Neurological: Positive for weakness. Negative for tremors, seizures and facial  "asymmetry.   Psychiatric/Behavioral: Negative for agitation and confusion.   All other systems reviewed and are negative.      Physical Exam   BP: (!) 138/91  Pulse: 111  Temp: 97.6  F (36.4  C)  Resp: 16  Height: 198.1 cm (6' 6\")  Weight: (!) 182.8 kg (403 lb)  SpO2: 96 %      Physical Exam  Vitals and nursing note reviewed.   Constitutional:       General: He is not in acute distress.     Appearance: Normal appearance. He is not ill-appearing or toxic-appearing.   HENT:      Head: Normocephalic. No raccoon eyes, right periorbital erythema or left periorbital erythema.      Right Ear: No drainage or tenderness.      Left Ear: No drainage or tenderness.      Nose: Nose normal.      Mouth/Throat:      Pharynx: Uvula midline. Oropharyngeal exudate and posterior oropharyngeal erythema present. No uvula swelling.      Tonsils: 1+ on the right. 1+ on the left.   Eyes:      General: Lids are normal. Gaze aligned appropriately. No scleral icterus.     Extraocular Movements: Extraocular movements intact.   Neck:      Trachea: No tracheal deviation.   Cardiovascular:      Rate and Rhythm: Normal rate.   Pulmonary:      Effort: Pulmonary effort is normal. No respiratory distress.      Breath sounds: No stridor.      Comments: SaO2 is 96% on room air.  He does not appear to be in respiratory distress.  No tachypnea  Abdominal:      Tenderness: There is no abdominal tenderness.   Musculoskeletal:         General: No deformity or signs of injury. Normal range of motion.      Cervical back: Normal range of motion. No signs of trauma.   Lymphadenopathy:      Cervical: Cervical adenopathy present.   Skin:     General: Skin is warm and dry.      Coloration: Skin is not jaundiced or pale.   Neurological:      General: No focal deficit present.      Mental Status: He is alert and oriented to person, place, and time.      GCS: GCS eye subscore is 4. GCS verbal subscore is 5. GCS motor subscore is 6.      Motor: No tremor or seizure " activity.   Psychiatric:         Attention and Perception: Attention normal.         Mood and Affect: Mood normal.         ED Course       Results for orders placed or performed during the hospital encounter of 07/28/22 (from the past 24 hour(s))   Symptomatic; Yes; 7/26/2022 Influenza A/B & SARS-CoV2 (COVID-19) Virus PCR Multiplex Nose    Specimen: Nose; Swab   Result Value Ref Range    Influenza A PCR Negative Negative    Influenza B PCR Negative Negative    RSV PCR Negative Negative    SARS CoV2 PCR Negative Negative    Narrative    Testing was performed using the Xpert Xpress CoV2/Flu/RSV Assay on the [employer] Instrument. This test should be ordered for the detection of SARS-CoV-2 and influenza viruses in individuals who meet clinical and/or epidemiological criteria. Test performance is unknown in asymptomatic patients. This test is for in vitro diagnostic use under the FDA EUA for laboratories certified under CLIA to perform high or moderate complexity testing. This test has not been FDA cleared or approved. A negative result does not rule out the presence of PCR inhibitors in the specimen or target RNA in concentration below the limit of detection for the assay. If only one viral target is positive but coinfection with multiple targets is suspected, the sample should be re-tested with another FDA cleared, approved, or authorized test, if coinfection would change clinical management. This test was validated by the [facility]. These laboratories are certified under the Clinical  Laboratory Improvement Amendments of 1988 (CLIA-88) as qualified to perform high complexity laboratory testing.   Group A Streptococcus PCR Throat Swab    Specimen: Throat; Swab   Result Value Ref Range    Group A strep by PCR Not Detected Not Detected    Narrative    The Xpert Xpress Strep A test, performed on the [employer]  Instrument Systems, is a rapid, qualitative in vitro diagnostic test for the  detection of Streptococcus pyogenes (Group A ß-hemolytic Streptococcus, Strep A) in throat swab specimens from patients with signs and symptoms of pharyngitis. The Xpert Xpress Strep A test can be used as an aid in the diagnosis of Group A Streptococcal pharyngitis. The assay is not intended to monitor treatment for Group A Streptococcus infections. The Xpert Xpress Strep A test utilizes an automated real-time polymerase chain reaction (PCR) to detect Streptococcus pyogenes DNA.       Medications   cefTRIAXone (ROCEPHIN) in lidocaine 1% (PF) injection 1 g (has no administration in time range)       Assessments & Plan (with Medical Decision Making)     I have reviewed the nursing notes.    I have reviewed the findings, diagnosis, plan and need for follow up with the patient.      New Prescriptions    AMOXICILLIN-CLAVULANATE (AUGMENTIN) 875-125 MG TABLET    Take 1 tablet by mouth 2 times daily       Final diagnoses:   Acute bacterial pharyngitis   Anterior cervical adenopathy     Afebrile at this time.  3-day history of flulike symptoms and sore throat.  Some cervical lymphadenopathy as well as erythema to his pharyngeal area.  Strep test returns negative.  Influenza, RSV and COVID tests are negative.  He was given Rocephin IM.  Rx for Augmentin as well.  I discussed symptomatic support and salt water gargles.  Work note was written.  Return if there is any concerns for further evaluation as needed.    7/28/2022   [facility]     Kin Salmon PA-C  07/28/22 [id number]

## 2022-12-24 ENCOUNTER — HOSPITAL ENCOUNTER (EMERGENCY)
Facility: OTHER | Age: 27
Discharge: LEFT BEFORE DISCHARGE | End: 2022-12-24
Admitting: FAMILY MEDICINE
Payer: COMMERCIAL

## 2022-12-24 VITALS
BODY MASS INDEX: 36.45 KG/M2 | SYSTOLIC BLOOD PRESSURE: 147 MMHG | DIASTOLIC BLOOD PRESSURE: 78 MMHG | TEMPERATURE: 96.9 F | OXYGEN SATURATION: 94 % | HEART RATE: 87 BPM | RESPIRATION RATE: 18 BRPM | HEIGHT: 78 IN | WEIGHT: 315 LBS

## 2022-12-24 LAB
FLUAV RNA SPEC QL NAA+PROBE: NEGATIVE
FLUBV RNA RESP QL NAA+PROBE: NEGATIVE
RSV RNA SPEC NAA+PROBE: NEGATIVE
SARS-COV-2 RNA RESP QL NAA+PROBE: NEGATIVE

## 2022-12-24 PROCEDURE — 99283 EMERGENCY DEPT VISIT LOW MDM: CPT | Mod: CS | Performed by: FAMILY MEDICINE

## 2022-12-24 PROCEDURE — 87637 SARSCOV2&INF A&B&RSV AMP PRB: CPT | Performed by: FAMILY MEDICINE

## 2022-12-24 PROCEDURE — C9803 HOPD COVID-19 SPEC COLLECT: HCPCS | Performed by: FAMILY MEDICINE

## 2022-12-25 NOTE — ED TRIAGE NOTES
Patient has been for 11 days and 8 days ago he saw a doctor was was told he had a virus.  Patient comes in stating he is still ill with cough, fever, vomiting and a sore throat.  Patient took tylenol today about 1500.     Triage Assessment     Row Name 12/24/22 1937       Triage Assessment (Adult)    Airway WDL WDL       Respiratory WDL    Respiratory WDL WDL       Skin Circulation/Temperature WDL    Skin Circulation/Temperature WDL WDL       Cardiac WDL    Cardiac WDL WDL       Peripheral/Neurovascular WDL    Peripheral Neurovascular WDL WDL       Cognitive/Neuro/Behavioral WDL    Cognitive/Neuro/Behavioral WDL WDL

## 2023-01-29 ENCOUNTER — HEALTH MAINTENANCE LETTER (OUTPATIENT)
Age: 28
End: 2023-01-29

## 2023-02-03 ENCOUNTER — HOSPITAL ENCOUNTER (EMERGENCY)
Facility: OTHER | Age: 28
Discharge: HOME | End: 2023-02-03
Attending: PHYSICIAN ASSISTANT | Admitting: PHYSICIAN ASSISTANT
Payer: COMMERCIAL

## 2023-02-03 ENCOUNTER — APPOINTMENT (OUTPATIENT)
Dept: GENERAL RADIOLOGY | Facility: OTHER | Age: 28
End: 2023-02-03
Attending: PHYSICIAN ASSISTANT
Payer: COMMERCIAL

## 2023-02-03 VITALS
BODY MASS INDEX: 47.38 KG/M2 | DIASTOLIC BLOOD PRESSURE: 75 MMHG | TEMPERATURE: 97 F | RESPIRATION RATE: 18 BRPM | HEART RATE: 80 BPM | SYSTOLIC BLOOD PRESSURE: 132 MMHG | OXYGEN SATURATION: 98 % | WEIGHT: 315 LBS

## 2023-02-03 DIAGNOSIS — R07.89 CHEST DISCOMFORT: ICD-10-CM

## 2023-02-03 DIAGNOSIS — R21 RASH: ICD-10-CM

## 2023-02-03 DIAGNOSIS — R45.851 SUICIDAL IDEATION: ICD-10-CM

## 2023-02-03 DIAGNOSIS — F41.9 ANXIETY: ICD-10-CM

## 2023-02-03 DIAGNOSIS — E11.9 TYPE 2 DIABETES MELLITUS WITHOUT COMPLICATION, WITHOUT LONG-TERM CURRENT USE OF INSULIN (H): ICD-10-CM

## 2023-02-03 LAB
ALBUMIN UR-MCNC: NEGATIVE MG/DL
AMPHETAMINES UR QL SCN: ABNORMAL
ANION GAP SERPL CALCULATED.3IONS-SCNC: 10 MMOL/L (ref 7–15)
APAP SERPL-MCNC: <5 UG/ML (ref 10–30)
APPEARANCE UR: CLEAR
BARBITURATES UR QL SCN: ABNORMAL
BASOPHILS # BLD AUTO: 0 10E3/UL (ref 0–0.2)
BASOPHILS NFR BLD AUTO: 0 %
BENZODIAZ UR QL SCN: ABNORMAL
BILIRUB UR QL STRIP: NEGATIVE
BUN SERPL-MCNC: 6.5 MG/DL (ref 6–20)
BZE UR QL SCN: ABNORMAL
CALCIUM SERPL-MCNC: 9.3 MG/DL (ref 8.6–10)
CANNABINOIDS UR QL SCN: ABNORMAL
CHLORIDE SERPL-SCNC: 100 MMOL/L (ref 98–107)
COLOR UR AUTO: YELLOW
CREAT SERPL-MCNC: 0.55 MG/DL (ref 0.67–1.17)
DEPRECATED HCO3 PLAS-SCNC: 28 MMOL/L (ref 22–29)
EOSINOPHIL # BLD AUTO: 0 10E3/UL (ref 0–0.7)
EOSINOPHIL NFR BLD AUTO: 0 %
ERYTHROCYTE [DISTWIDTH] IN BLOOD BY AUTOMATED COUNT: 12.7 % (ref 10–15)
ETHANOL SERPL-MCNC: <0.01 G/DL
GFR SERPL CREATININE-BSD FRML MDRD: >90 ML/MIN/1.73M2
GLUCOSE SERPL-MCNC: 139 MG/DL (ref 70–99)
GLUCOSE UR STRIP-MCNC: NEGATIVE MG/DL
HCT VFR BLD AUTO: 40.3 % (ref 40–53)
HGB BLD-MCNC: 14.1 G/DL (ref 13.3–17.7)
HGB UR QL STRIP: NEGATIVE
HOLD SPECIMEN: NORMAL
IMM GRANULOCYTES # BLD: 0 10E3/UL
IMM GRANULOCYTES NFR BLD: 0 %
KETONES UR STRIP-MCNC: 20 MG/DL
LEUKOCYTE ESTERASE UR QL STRIP: NEGATIVE
LYMPHOCYTES # BLD AUTO: 1.5 10E3/UL (ref 0.8–5.3)
LYMPHOCYTES NFR BLD AUTO: 15 %
MAGNESIUM SERPL-MCNC: 2.3 MG/DL (ref 1.7–2.3)
MCH RBC QN AUTO: 29.4 PG (ref 26.5–33)
MCHC RBC AUTO-ENTMCNC: 35 G/DL (ref 31.5–36.5)
MCV RBC AUTO: 84 FL (ref 78–100)
MONOCYTES # BLD AUTO: 0.4 10E3/UL (ref 0–1.3)
MONOCYTES NFR BLD AUTO: 4 %
MUCOUS THREADS #/AREA URNS LPF: PRESENT /LPF
NEUTROPHILS # BLD AUTO: 8.2 10E3/UL (ref 1.6–8.3)
NEUTROPHILS NFR BLD AUTO: 81 %
NITRATE UR QL: NEGATIVE
NRBC # BLD AUTO: 0 10E3/UL
NRBC BLD AUTO-RTO: 0 /100
OPIATES UR QL SCN: ABNORMAL
PCP QUAL URINE (ROCHE): ABNORMAL
PH UR STRIP: 6.5 [PH] (ref 5–9)
PLATELET # BLD AUTO: 424 10E3/UL (ref 150–450)
POTASSIUM SERPL-SCNC: 4 MMOL/L (ref 3.4–5.3)
RBC # BLD AUTO: 4.79 10E6/UL (ref 4.4–5.9)
RBC URINE: 0 /HPF
SALICYLATES SERPL-MCNC: <0.5 MG/DL
SODIUM SERPL-SCNC: 138 MMOL/L (ref 136–145)
SP GR UR STRIP: 1.01 (ref 1–1.03)
TROPONIN T SERPL HS-MCNC: <6 NG/L
TSH SERPL DL<=0.005 MIU/L-ACNC: 1.47 UIU/ML (ref 0.3–4.2)
UROBILINOGEN UR STRIP-MCNC: NORMAL MG/DL
WBC # BLD AUTO: 10.1 10E3/UL (ref 4–11)
WBC URINE: <1 /HPF

## 2023-02-03 PROCEDURE — 80143 DRUG ASSAY ACETAMINOPHEN: CPT | Performed by: PHYSICIAN ASSISTANT

## 2023-02-03 PROCEDURE — 84443 ASSAY THYROID STIM HORMONE: CPT | Performed by: PHYSICIAN ASSISTANT

## 2023-02-03 PROCEDURE — 93005 ELECTROCARDIOGRAM TRACING: CPT | Performed by: PHYSICIAN ASSISTANT

## 2023-02-03 PROCEDURE — 81001 URINALYSIS AUTO W/SCOPE: CPT | Mod: XU | Performed by: PHYSICIAN ASSISTANT

## 2023-02-03 PROCEDURE — 82310 ASSAY OF CALCIUM: CPT | Performed by: PHYSICIAN ASSISTANT

## 2023-02-03 PROCEDURE — 258N000003 HC RX IP 258 OP 636: Performed by: PHYSICIAN ASSISTANT

## 2023-02-03 PROCEDURE — 71045 X-RAY EXAM CHEST 1 VIEW: CPT

## 2023-02-03 PROCEDURE — 83735 ASSAY OF MAGNESIUM: CPT | Performed by: PHYSICIAN ASSISTANT

## 2023-02-03 PROCEDURE — 84484 ASSAY OF TROPONIN QUANT: CPT | Performed by: PHYSICIAN ASSISTANT

## 2023-02-03 PROCEDURE — 80307 DRUG TEST PRSMV CHEM ANLYZR: CPT | Performed by: PHYSICIAN ASSISTANT

## 2023-02-03 PROCEDURE — 99285 EMERGENCY DEPT VISIT HI MDM: CPT | Mod: 25 | Performed by: PHYSICIAN ASSISTANT

## 2023-02-03 PROCEDURE — 93010 ELECTROCARDIOGRAM REPORT: CPT | Performed by: INTERNAL MEDICINE

## 2023-02-03 PROCEDURE — 36415 COLL VENOUS BLD VENIPUNCTURE: CPT | Performed by: PHYSICIAN ASSISTANT

## 2023-02-03 PROCEDURE — 96361 HYDRATE IV INFUSION ADD-ON: CPT | Performed by: PHYSICIAN ASSISTANT

## 2023-02-03 PROCEDURE — 96374 THER/PROPH/DIAG INJ IV PUSH: CPT | Performed by: PHYSICIAN ASSISTANT

## 2023-02-03 PROCEDURE — 85004 AUTOMATED DIFF WBC COUNT: CPT | Performed by: PHYSICIAN ASSISTANT

## 2023-02-03 PROCEDURE — 250N000013 HC RX MED GY IP 250 OP 250 PS 637: Performed by: PHYSICIAN ASSISTANT

## 2023-02-03 PROCEDURE — 250N000011 HC RX IP 250 OP 636: Performed by: PHYSICIAN ASSISTANT

## 2023-02-03 PROCEDURE — 99284 EMERGENCY DEPT VISIT MOD MDM: CPT | Performed by: PHYSICIAN ASSISTANT

## 2023-02-03 PROCEDURE — 80179 DRUG ASSAY SALICYLATE: CPT | Performed by: PHYSICIAN ASSISTANT

## 2023-02-03 PROCEDURE — 82077 ASSAY SPEC XCP UR&BREATH IA: CPT | Performed by: PHYSICIAN ASSISTANT

## 2023-02-03 RX ORDER — HYDROXYZINE PAMOATE 25 MG/1
25 CAPSULE ORAL 4 TIMES DAILY PRN
Qty: 30 CAPSULE | Refills: 2 | Status: SHIPPED | OUTPATIENT
Start: 2023-02-03

## 2023-02-03 RX ORDER — GABAPENTIN 300 MG/1
300 CAPSULE ORAL ONCE
Status: COMPLETED | OUTPATIENT
Start: 2023-02-03 | End: 2023-02-03

## 2023-02-03 RX ORDER — LORAZEPAM 2 MG/ML
1 INJECTION INTRAMUSCULAR ONCE
Status: COMPLETED | OUTPATIENT
Start: 2023-02-03 | End: 2023-02-03

## 2023-02-03 RX ORDER — GABAPENTIN 300 MG/1
300 CAPSULE ORAL 3 TIMES DAILY
Qty: 30 CAPSULE | Refills: 2 | Status: SHIPPED | OUTPATIENT
Start: 2023-02-03 | End: 2023-03-16

## 2023-02-03 RX ORDER — PRENATAL VIT 91/IRON/FOLIC/DHA 28-975-200
COMBINATION PACKAGE (EA) ORAL 2 TIMES DAILY
Qty: 42 G | Refills: 0 | Status: SHIPPED | OUTPATIENT
Start: 2023-02-03 | End: 2023-03-10

## 2023-02-03 RX ADMIN — LORAZEPAM 1 MG: 2 INJECTION INTRAMUSCULAR; INTRAVENOUS at 12:42

## 2023-02-03 RX ADMIN — GABAPENTIN 300 MG: 300 CAPSULE ORAL at 18:48

## 2023-02-03 RX ADMIN — SODIUM CHLORIDE 1000 ML: 9 INJECTION, SOLUTION INTRAVENOUS at 12:43

## 2023-02-03 ASSESSMENT — ENCOUNTER SYMPTOMS
HEADACHES: 0
EYE PAIN: 0
SHORTNESS OF BREATH: 0
SLEEP DISTURBANCE: 1
ABDOMINAL PAIN: 0
FEVER: 0
BRUISES/BLEEDS EASILY: 0
BACK PAIN: 0
SINUS PAIN: 0
POLYDIPSIA: 1
HEMATURIA: 0
NAUSEA: 0
NERVOUS/ANXIOUS: 1
LIGHT-HEADEDNESS: 0
VOMITING: 0
DYSURIA: 0
CHILLS: 0
NECK PAIN: 0
SORE THROAT: 0
FREQUENCY: 1

## 2023-02-03 ASSESSMENT — COLUMBIA-SUICIDE SEVERITY RATING SCALE - C-SSRS
5. HAVE YOU STARTED TO WORK OUT OR WORKED OUT THE DETAILS OF HOW TO KILL YOURSELF? DO YOU INTEND TO CARRY OUT THIS PLAN?: NO
MOST LETHAL DATE: 65592
TOTAL  NUMBER OF INTERRUPTED ATTEMPTS LIFETIME: NO
2. HAVE YOU ACTUALLY HAD ANY THOUGHTS OF KILLING YOURSELF?: YES
6. HAVE YOU EVER DONE ANYTHING, STARTED TO DO ANYTHING, OR PREPARED TO DO ANYTHING TO END YOUR LIFE?: YES
TOTAL  NUMBER OF ACTUAL ATTEMPTS LIFETIME: 3
FIRST ATTEMPT DATE: 65592
LETHALITY/MEDICAL DAMAGE CODE MOST RECENT POTENTIAL ATTEMPT: BEHAVIOR NOT LIKELY TO RESULT IN INJURY
ATTEMPT LIFETIME: YES
LETHALITY/MEDICAL DAMAGE CODE MOST RECENT ACTUAL ATTEMPT: NO PHYSICAL DAMAGE OR VERY MINOR PHYSICAL DAMAGE
ATTEMPT PAST THREE MONTHS: NO
4. HAVE YOU HAD THESE THOUGHTS AND HAD SOME INTENTION OF ACTING ON THEM?: NO
6. HAVE YOU EVER DONE ANYTHING, STARTED TO DO ANYTHING, OR PREPARED TO DO ANYTHING TO END YOUR LIFE?: NO
1. IN THE PAST MONTH, HAVE YOU WISHED YOU WERE DEAD OR WISHED YOU COULD GO TO SLEEP AND NOT WAKE UP?: YES

## 2023-02-03 ASSESSMENT — ACTIVITIES OF DAILY LIVING (ADL)
ADLS_ACUITY_SCORE: 35

## 2023-02-03 NOTE — DISCHARGE INSTRUCTIONS
Anxiety  -Start taking Zoloft 50 mg daily.  If it makes you feel tired, take in the evening.  If it makes you feel awake, take in the morning  -Take gabapentin 300 mg every 8 hours for anxiety  -Use Vistaril 25 mg every 6 hours as needed for breakthrough anxiety.  -Follow-up with mental health.  A good place in the [facility] located [place] at [address], right by the [facility].   -If you feel like your anxiety is being not controlled, you have worsening suicidal ideation, or any other symptom, please return to the ER immediately.    Diabetes  -Your blood sugar today was 139.  I will restart your metformin.  Metformin 500 mg every 12 hours.  -Recommend following up with your primary care doctor.  Return to the ER for any worsening symptoms.    Chest discomfort  -Follow-up with primary care if symptoms persist.  No signs of heart attack or cardiac arrhythmia.  Return to the ER for any worsening of symptoms.      Aftercare Plan  If I am feeling unsafe or I am in a crisis, I will:   Contact my established care providers   Call the National Suicide Prevention Lifeline: 988  Go to the nearest emergency room   Call 911     Warning signs that I or other people might notice when a crisis is developing for me:   Suicidal thoughts with plan or intent  Feeling unsafe      Things I am able to do on my own to cope or help me feel better:  Refrain from drug use  Contact family or loved ones       Things that I am able to do with others to cope or help me better:   Activity with nephew  Watch movie/tv program       Things I can use or do for distraction:   Mindfulness activities  Tv/Movies     Changes I can make to support my mental health and wellness:   Refrain from drug use  Accept help for Chemical Dependency Evaluation while at [facility]  Accept help from CRT :   [facility] partners with you local crisis response team (CRT) to provide crisis follow up after you leave  the hospital. Someone from the CRT team will be reaching out to you via phone, or will schedule an in person visit as appropriate. You can also reach CRT by calling [phone]. Clients in the [facility] ([place], [place], [place], [place], [place], [place] and [facility]) can dial 211 for immediate assistance. Their website is [email or web address]    People in my life that I can ask for help:   [place] Other  Mother    Your county has a mental health crisis team you can call 24/7: [place] [facility]  [phone].    Other things that are important when I'm in crisis:   If you feel you are unable to manage, contact CRT mobile outreach (number above)      Additional resources and information:     I was unable to schedule with outside providers without insurance.  CRT was contacted and will be reaching out to you.    Program Name [facility]  Phone Number [phone]  Website: [email or web address]  Address: [address] [address]    Attend AA meetings    Additional Resources & Instructions Given To Patient  Details  [facility] maintains an extensive network of licensed behavioral health providers to connect   patients with the services they need. We do not charge providers a fee to participate in our   referral network. We match patients with providers based on a patient s specific needs,   insurance coverage, and location. Our first effort will be to refer you to a provider within your   care system, and will utilize providers outside your care system as needed.   If you are unable to schedule appointments with [facility], you can call [facility]   Providers ([facility]) directly to be rescheduled @ [phone]. Coordinators from [facility] will be   calling you tomorrow either way to ensure that you have the resources you need.    Crisis Lines  Crisis Text Line  Text 006791  You will be connected with a trained live crisis counselor to provide  "support.    Por espanol, texto  TRUE a 659658 o texto a 442-AYUDAME en WhatsApp    [facility] (LGBTQ Youth Crisis Line)  9.754.802.6373  text START to 815-810      Community Resources  Fast Tracker  Linking people to mental health and substance use disorder resources  [employer]     [place] Mental [facility] Warm Line  Peer to peer support  Monday thru Saturday, 12 pm to 10 pm  718.413.5182 or 4.570.035.6600  Text \"Support\" to 46365    National [place] on Mental [employer]ness (ALESIA)  533.811.3882 or 1.888.ALESIA.HELPS      Mental Health Apps  My3  https://anywayanyday.org/    VirtualHopeBox  https://[facility]/apps/virtual-hope-box/      Additional Information  Today you were seen by a licensed mental health professional through Triage and Transition services, [facility] ([facility])  for a crisis assessment in the Emergency Department at [facility].  It is recommended that you follow up with your established providers (psychiatrist, mental health therapist, and/or primary care doctor - as relevant) as soon as possible. Coordinators from [facility] will be calling you in the next 24-48 hours to ensure that you have the resources you need.  You can also contact [facility] coordinators directly at [phone]. You may have been scheduled for or offered an appointment with a mental health provider. [facility] maintains an extensive network of licensed behavioral health providers to connect patients with the services they need.  We do not charge providers a fee to participate in our referral network.  We match patients with providers based on a patient's specific needs, insurance coverage, and location.  Our first effort will be to refer you to a provider within your care system, and will utilize providers outside your care system as needed.        "

## 2023-02-03 NOTE — ED TRIAGE NOTES
"Patient reports that he has felt down depressed and hopeless over past couple weeks. He did report feeling suicidal at times. Denies a suicidal attempt. He said, \"I think I am withdrawing.\" Writer asked from what he said, \"From adderal, gabapentin, and suboxone, he denies taking them to commit suicide he reports taking it for a \"High\". Patient reports that he is not prescribed these but he buys them off the street.       "

## 2023-02-03 NOTE — ED TRIAGE NOTES
ED Nursing Triage Note (General)   ________________________________    Ant Tejeda is a 27 year old Male that presents to triage with anxiety and insomnia.  With history of this for past 3 days. Patient doesn't report a specific event that is causing the anxiety.   BP (!) 183/83   Pulse 72   Temp 97.2  F (36.2  C) (Tympanic)   Resp 18   SpO2 98% t  Patient appears alert , in no acute distress., and cooperative behavior.  GCS Total = 15  Airway: intact  Breathing noted as Normal  Circulation Normal  Skin:  Normal  Action taken:  Triage order initiated      PRE HOSPITAL PRIOR LIVING SITUATION Alone     Triage Assessment     Row Name 02/03/23 1132       Triage Assessment (Adult)    Airway WDL WDL       Respiratory WDL    Respiratory WDL WDL       Skin Circulation/Temperature WDL    Skin Circulation/Temperature WDL WDL       Cardiac WDL    Cardiac WDL WDL       Peripheral/Neurovascular WDL    Peripheral Neurovascular WDL WDL       Cognitive/Neuro/Behavioral WDL    Cognitive/Neuro/Behavioral WDL WDL       [place] Coma Scale    Best Eye Response 4-->(E4) spontaneous    Best Motor Response 6-->(M6) obeys commands    Best Verbal Response 5-->(V5) oriented    [place] Coma Scale Score 15

## 2023-02-03 NOTE — ED NOTES
Writer placed patient in scrubs however scrubs are not big enough. Talked to charge nurse and allowed patient to wear own t shirt and sweat pants. Verified no belongings in clothes.

## 2023-02-03 NOTE — ED PROVIDER NOTES
EMERGENCY DEPARTMENT ENCOUNTER      NAME: Ant Tejeda  AGE: 27 year old male  YOB: 1995  MRN: [id number]  EVALUATION DATE & TIME: 2/3/2023 11:48 AM    PCP: Aung Cornell    ED PROVIDER: Joel Dash PA-C      CHIEF COMPLAINT:  Chief Complaint   Patient presents with     Anxiety     Insomnia     Suicidal         FINAL IMPRESSION:  1. Suicidal ideation    2. Anxiety    3. Type 2 diabetes mellitus without complication, without long-term current use of insulin (H)    4. Rash    5. Chest discomfort        ED COURSE, MEDICAL DECISION MAKING, ASSESSMENT, AND PLAN:    Pertinent Labs & Imaging studies reviewed. (See chart for details)  Results for orders placed or performed during the hospital encounter of 02/03/23   XR Chest Port 1 View    Impression    IMPRESSION:  No acute cardiopulmonary disease.      IZZY CHURCH MD         SYSTEM ID:  [id number]   Basic metabolic panel   Result Value Ref Range    Sodium 138 136 - 145 mmol/L    Potassium 4.0 3.4 - 5.3 mmol/L    Chloride 100 98 - 107 mmol/L    Carbon Dioxide (CO2) 28 22 - 29 mmol/L    Anion Gap 10 7 - 15 mmol/L    Urea Nitrogen 6.5 6.0 - 20.0 mg/dL    Creatinine 0.55 (L) 0.67 - 1.17 mg/dL    Calcium 9.3 8.6 - 10.0 mg/dL    Glucose 139 (H) 70 - 99 mg/dL    GFR Estimate >90 >60 mL/min/1.73m2   Ethanol GH   Result Value Ref Range    Alcohol ethyl <0.01 <=0.01 g/dL   TSH Reflex GH   Result Value Ref Range    TSH 1.47 0.30 - 4.20 uIU/mL   Salicylate level   Result Value Ref Range    Salicylate <0.5   mg/dL   Acetaminophen level   Result Value Ref Range    Acetaminophen <5.0 (L) 10.0 - 30.0 ug/mL   Result Value Ref Range    Troponin T, High Sensitivity <6 <=22 ng/L   Result Value Ref Range    Magnesium 2.3 1.7 - 2.3 mg/dL   UA with Microscopic reflex to Culture    Specimen: Urine, Midstream   Result Value Ref Range    Color Urine Yellow Colorless, Straw, Light Yellow, Yellow    Appearance Urine Clear Clear    Glucose Urine Negative Negative  mg/dL    Bilirubin Urine Negative Negative    Ketones Urine 20 (A) Negative mg/dL    Specific Gravity Urine 1.011 1.000 - 1.030    Blood Urine Negative Negative    pH Urine 6.5 5.0 - 9.0    Protein Albumin Urine Negative Negative mg/dL    Urobilinogen Urine Normal Normal, 2.0 mg/dL    Nitrite Urine Negative Negative    Leukocyte Esterase Urine Negative Negative    Mucus Urine Present (A) None Seen /LPF    RBC Urine 0 <=2 /HPF    WBC Urine <1 <=5 /HPF   CBC with platelets and differential   Result Value Ref Range    WBC Count 10.1 4.0 - 11.0 10e3/uL    RBC Count 4.79 4.40 - 5.90 10e6/uL    Hemoglobin 14.1 13.3 - 17.7 g/dL    Hematocrit 40.3 40.0 - 53.0 %    MCV 84 78 - 100 fL    MCH 29.4 26.5 - 33.0 pg    MCHC 35.0 31.5 - 36.5 g/dL    RDW 12.7 10.0 - 15.0 %    Platelet Count 424 150 - 450 10e3/uL    % Neutrophils 81 %    % Lymphocytes 15 %    % Monocytes 4 %    % Eosinophils 0 %    % Basophils 0 %    % Immature Granulocytes 0 %    NRBCs per 100 WBC 0 <1 /100    Absolute Neutrophils 8.2 1.6 - 8.3 10e3/uL    Absolute Lymphocytes 1.5 0.8 - 5.3 10e3/uL    Absolute Monocytes 0.4 0.0 - 1.3 10e3/uL    Absolute Eosinophils 0.0 0.0 - 0.7 10e3/uL    Absolute Basophils 0.0 0.0 - 0.2 10e3/uL    Absolute Immature Granulocytes 0.0 <=0.4 10e3/uL    Absolute NRBCs 0.0 10e3/uL   Extra Blue Top Tube   Result Value Ref Range    Hold Specimen [facility]    Drug abuse screen 77 urine (FL, RH, SH)   Result Value Ref Range    Amphetamines Urine Screen Positive (A) Screen Negative    Barbituates Urine Screen Negative Screen Negative    Benzodiazepine Urine Screen Negative Screen Negative    Cannabinoids Urine Screen Negative Screen Negative    Opiates Urine Screen Negative Screen Negative    PCP Urine Screen Negative Screen Negative    Cocaine Urine Screen Negative Screen Negative     No results found for: ABORH      The patient was interviewed and examined. History in the chart was reviewed.  Vitals and triage note were reviewed /75    Pulse 80   Temp 97  F (36.1  C) (Tympanic)   Resp 18   Wt (!) 186 kg (410 lb)   SpO2 98%   BMI 47.38 kg/m      PPE worn during patient evaluation:  Mask: Yes, surgical  Eye Protection: No  Gown: No  Hair cover: No  Face Shield: No  Patient wearing a mask: yes    HPI and physical exam as below.  Vital signs reviewed.  Nurses notes reviewed.  Differential diagnosis as below.    Labs, EKG, and Imaging:  Amphetamines positive, patient does endorse using Adderall.  UA negative for hematuria or UTI.  Glucose 139.  No renal dysfunction.  No leukocytosis.  EKG was normal-appearing with no ST segment deviations and suggest ACS/MI.  Screening troponin was not elevated.  Remaining laboratory studies were otherwise unremarkable.    Medications, Interventions, Reassessments, & Response to Treatments:  Patient given Ativan 1 g here in the ER with significant proved anxiety.  Patient was given oral gabapentin 300 mg and metformin 5 mg here prior to discharge.  No adverse effects.  Discussed laboratory and imaging results and treatment plan with patient.  Patient was amenable to plan.  Patient throughout his course of stay here denied any suicidal plans does have is feeling anxious.    Consultations:  [facility]     Medical Calculators/Risk Stratification Tools/Decision Rules:  Patient was PERC negative, low suspicion for acute PE    Overall Impression/Treatment Plan/Discharge Info/Follow-up/Medical Necessity for Admission:  Suicidal ideation  -No specific plan.  Patient having anxiety which is not currently being addressed with medication or mental health therapy.  Denied any specific plan to myself or to [facility] .  Plan is for patient to follow-up with a mental health specialist.  I did start patient on Zoloft, gabapentin, and Vistaril for anxiety.  Recommend following up with [facility].  Close return precautions to the ER were discussed.    Anxiety  -Treatment plan as above    Type 2  diabetes  -Blood sugar 139.  No signs of DKA.  No signs of renal dysfunction.  We will restart metformin 5 mg every 12 hours.  Follow-up with primary care.    Rash  -Suspect fungal rash on right leg.  We will start patient on terbinafine cream every 12 hours for 2 to 3 weeks.  Follow-up primary care.    Chest discomfort  -EKG was normal-appearing without ST segment deviation to Jenifer ACS/MI.  Screening troponin was not elevated.  Patient was PERC negative, low suspicion for acute PE.  Chest x-ray was negative no signs of pneumonia or pneumothorax.  No electrolyte abnormalities.  Patient states he does have some underlying acid reflux which may be contributory.  Patient also feel extremely anxious which also may be contributory.  Recommend following up with primary care.  Return to ER for any worsening of symptoms.    MDM Key Documentation Elements for Patient's Evaluation:  1. Differential diagnosis to include high risk considerations: Differential includes but is not limited to suicidal ideation, suicidal plan, type 2 diabetes, withdrawal,  2. Escalation to admission/observation considered: Admission/observation considered, currently no indication for inpatient criteria for suicidal ideation with plan.  3. Discussions and management with other clinicians:  Yes, [facility]   3a. Independent interpretation of testing performed by another health professional:  No  3b. Discussion of management or test interpretation with another health professional: No  4. Independent interpretation of tests:  ordering and review of 3+ test(s); review of 3+ test result(s) ordered prior to this encounter  5. Discussion of test interpretations with radiology:  No  6. History obtained from source other than patient/assessment requiring an independent historian:  No  7. Review of non-ED records:  review of 3+ prior external note(s) (see separate area of note for details)  8. Diagnostic tests considered but not ultimately  performed/deferred: Considered CT angiogram of the chest, but patient was PERC negative, low suspicion for acute PE.  9. Prescription medications considered but not prescribed: Started patient on Zoloft, gabapentin, Vistaril  10. Chronic conditions affecting care: Obesity, anxiety  11. Care affected by social determinants of health: Lack of health insurance    The patient's management involved:   - Laboratory studies  - Imaging studies  - parenteral controlled substance  - prescription drug management  - decision regarding hospitalization    A shared decision making model was used. Plan and all results were discussed  Time was taken to answer all questions. Patient and/or associated parties understood and were agreeable to treatment plan.  Warning signs and close return precautions to return to the ED given. Discharged with discharge instructions outlining plan for further care and follow up.  Copy of results given. Discharged in stable condition      MEDICATIONS GIVEN IN THE EMERGENCY:  Medications   0.9% sodium chloride BOLUS (0 mLs Intravenous Stopped 2/3/23 1437)   LORazepam (ATIVAN) injection 1 mg (1 mg Intravenous Given 2/3/23 1242)   metFORMIN (GLUCOPHAGE) tablet 500 mg (500 mg Oral Not Given 2/3/23 1849)   gabapentin (NEURONTIN) capsule 300 mg (300 mg Oral Given 2/3/23 1848)       NEW PRESCRIPTIONS STARTED AT TODAY'S ER VISIT:  New Prescriptions    GABAPENTIN (NEURONTIN) 300 MG CAPSULE    Take 1 capsule (300 mg) by mouth 3 times daily    HYDROXYZINE (VISTARIL) 25 MG CAPSULE    Take 1 capsule (25 mg) by mouth 4 times daily as needed for anxiety    METFORMIN (GLUCOPHAGE) 500 MG TABLET    Take 1 tablet (500 mg) by mouth 2 times daily (with meals)    SERTRALINE (ZOLOFT) 50 MG TABLET    Take 1 tab daily.  If Zoloft makes you sleepy, take it in the evening.  If Zoloft makes you feel more awake, take in the morning.    TERBINAFINE (LAMISIL) 1 % EXTERNAL CREAM    Apply topically 2 times daily Apply every 12 hours  for 2 to 3 weeks over rash.          =================================================================    HPI  Ant Tejeda is a pleasant 27 year old male with history of uncontrolled type 2 diabetes, obesity, and anxiety who presents to the ER today with multiple complaints.    Patient states that he has been feeling increasingly anxious, depressed, and hopeless for the past few weeks.  Patient was recently fired from his job.  Patient does feel suicidal but denies any suicidal attempt or suicide with plan.  Patient denies any suicidal attempts in the past.  Denies any homicidal ideation.  Patient states that his anxiety and depression has been causing hand to not be able to sleep.  Patient has been using Adderall, gabapentin and Suboxone for self treatment, but he does not have a prescription for these medications and has been getting them off the street.  Patient states that he may also be going through a slight withdrawl as he does take these medications on a consistent basis.    Patient states he is also been having some substernal chest discomfort, increased heartburn, chest tightness, and decreased appetite.  Patient states that he has been going to bathroom more frequently but denies any hematuria or dysuria.  Patient states that he is also been having blurry vision.  Patient does endorse history of type 2 diabetes.  Patient states that he was on metformin the past but is currently not taking indications for type 2 diabetes.    Patient also endorses a slightly pruritic rash of his right anterior lower leg.  This has been ongoing for the past 6 months.  Initially saw primary care last year for this without this was eczema.  Rash has been slowly getting bigger.  Ring shaped rash.  No erythema or warmth.  No fever or chills.  Patient also thinks that his both of his lower legs were slightly more swollen than normal but denies any pain or shortness of breath.  Also denies any fever, chills, nausea, vomiting,  constipation/diarrhea, back pain, headaches, lightheadedness.      REVIEW OF SYSTEMS   Review of Systems   Constitutional: Negative for chills and fever.   HENT: Negative for ear pain, sinus pain and sore throat.    Eyes: Positive for visual disturbance (Blurry vision). Negative for pain.   Respiratory: Negative for shortness of breath.    Cardiovascular: Positive for chest pain and leg swelling.   Gastrointestinal: Negative for abdominal pain, nausea and vomiting.   Endocrine: Positive for polydipsia.   Genitourinary: Positive for frequency and urgency. Negative for dysuria and hematuria.   Musculoskeletal: Negative for back pain and neck pain.   Skin: Positive for rash.   Allergic/Immunologic: Negative for immunocompromised state.   Neurological: Negative for light-headedness and headaches.   Hematological: Does not bruise/bleed easily.   Psychiatric/Behavioral: Positive for sleep disturbance and suicidal ideas. Negative for self-injury. The patient is nervous/anxious.        Remainder of systems reviewed, unless noted in HPI all others negative.      PAST MEDICAL HISTORY:  No past medical history on file.    PAST SURGICAL HISTORY:  No past surgical history on file.        CURRENT MEDICATIONS:    No current outpatient medications    ALLERGIES:  No Known Allergies    FAMILY HISTORY:  No family history on file.    SOCIAL HISTORY:   Social History     Socioeconomic History     Marital status: Single   Tobacco Use     Smoking status: Every Day     Packs/day: 0.25     Years: 5.00     Pack years: 1.25     Types: Cigarettes     Smokeless tobacco: Never     Tobacco comments:     Pt denies want of counseling    Substance and Sexual Activity     Alcohol use: Not Currently     Comment: once a month     Drug use: Yes     Frequency: 70.0 times per week     Types: Opiates     Comment: less than 10 a day     Sexual activity: Yes     Birth control/protection: Condom       PHYSICAL EXAM    VITAL SIGNS: /75   Pulse 80    Temp 97  F (36.1  C) (Tympanic)   Resp 18   Wt (!) 186 kg (410 lb)   SpO2 98%   BMI 47.38 kg/m      Patient Vitals for the past 24 hrs:   BP Temp Temp src Pulse Resp SpO2 Weight   02/03/23 1846 132/75 97  F (36.1  C) Tympanic 80 18 98 % --   02/03/23 1600 133/76 98  F (36.7  C) Tympanic -- 18 97 % --   02/03/23 1402 -- -- -- -- -- -- (!) 186 kg (410 lb)   02/03/23 1130 (!) 183/83 97.2  F (36.2  C) Tympanic 72 18 98 % --       Physical Exam  Vitals and nursing note reviewed.   Constitutional:       Appearance: Normal appearance. He is obese. He is not ill-appearing.   HENT:      Head: Normocephalic.      Nose: Nose normal.      Mouth/Throat:      Mouth: Mucous membranes are moist.      Pharynx: Oropharynx is clear.   Eyes:      Conjunctiva/sclera: Conjunctivae normal.   Cardiovascular:      Rate and Rhythm: Normal rate and regular rhythm.      Pulses: Normal pulses.      Heart sounds: Normal heart sounds. No murmur heard.    No friction rub. No gallop.   Pulmonary:      Effort: Pulmonary effort is normal.      Breath sounds: Normal breath sounds. No stridor. No wheezing, rhonchi or rales.   Abdominal:      General: Abdomen is flat. Bowel sounds are normal.      Palpations: Abdomen is soft.      Tenderness: There is no abdominal tenderness. There is no guarding or rebound.   Musculoskeletal:         General: Normal range of motion.      Cervical back: Normal range of motion and neck supple.   Skin:     General: Skin is warm and dry.      Capillary Refill: Capillary refill takes less than 2 seconds.      Findings: Rash present. No bruising.   Neurological:      General: No focal deficit present.      Mental Status: He is alert and oriented to person, place, and time.   Psychiatric:         Mood and Affect: Mood is anxious and depressed.         Speech: Speech normal.         Behavior: Behavior normal. Behavior is cooperative.         Thought Content: Thought content includes suicidal ideation. Thought content does  not include homicidal ideation. Thought content does not include homicidal or suicidal plan.         Judgment: Judgment normal.            LAB:  All pertinent labs reviewed and interpreted.  Results for orders placed or performed during the hospital encounter of 02/03/23   XR Chest Port 1 View    Impression    IMPRESSION:  No acute cardiopulmonary disease.      IZZY CHURCH MD         SYSTEM ID:  [id number]   Basic metabolic panel   Result Value Ref Range    Sodium 138 136 - 145 mmol/L    Potassium 4.0 3.4 - 5.3 mmol/L    Chloride 100 98 - 107 mmol/L    Carbon Dioxide (CO2) 28 22 - 29 mmol/L    Anion Gap 10 7 - 15 mmol/L    Urea Nitrogen 6.5 6.0 - 20.0 mg/dL    Creatinine 0.55 (L) 0.67 - 1.17 mg/dL    Calcium 9.3 8.6 - 10.0 mg/dL    Glucose 139 (H) 70 - 99 mg/dL    GFR Estimate >90 >60 mL/min/1.73m2   Ethanol GH   Result Value Ref Range    Alcohol ethyl <0.01 <=0.01 g/dL   TSH Reflex GH   Result Value Ref Range    TSH 1.47 0.30 - 4.20 uIU/mL   Salicylate level   Result Value Ref Range    Salicylate <0.5   mg/dL   Acetaminophen level   Result Value Ref Range    Acetaminophen <5.0 (L) 10.0 - 30.0 ug/mL   Result Value Ref Range    Troponin T, High Sensitivity <6 <=22 ng/L   Result Value Ref Range    Magnesium 2.3 1.7 - 2.3 mg/dL   UA with Microscopic reflex to Culture    Specimen: Urine, Midstream   Result Value Ref Range    Color Urine Yellow Colorless, Straw, Light Yellow, Yellow    Appearance Urine Clear Clear    Glucose Urine Negative Negative mg/dL    Bilirubin Urine Negative Negative    Ketones Urine 20 (A) Negative mg/dL    Specific Gravity Urine 1.011 1.000 - 1.030    Blood Urine Negative Negative    pH Urine 6.5 5.0 - 9.0    Protein Albumin Urine Negative Negative mg/dL    Urobilinogen Urine Normal Normal, 2.0 mg/dL    Nitrite Urine Negative Negative    Leukocyte Esterase Urine Negative Negative    Mucus Urine Present (A) None Seen /LPF    RBC Urine 0 <=2 /HPF    WBC Urine <1 <=5 /HPF   CBC with platelets  and differential   Result Value Ref Range    WBC Count 10.1 4.0 - 11.0 10e3/uL    RBC Count 4.79 4.40 - 5.90 10e6/uL    Hemoglobin 14.1 13.3 - 17.7 g/dL    Hematocrit 40.3 40.0 - 53.0 %    MCV 84 78 - 100 fL    MCH 29.4 26.5 - 33.0 pg    MCHC 35.0 31.5 - 36.5 g/dL    RDW 12.7 10.0 - 15.0 %    Platelet Count 424 150 - 450 10e3/uL    % Neutrophils 81 %    % Lymphocytes 15 %    % Monocytes 4 %    % Eosinophils 0 %    % Basophils 0 %    % Immature Granulocytes 0 %    NRBCs per 100 WBC 0 <1 /100    Absolute Neutrophils 8.2 1.6 - 8.3 10e3/uL    Absolute Lymphocytes 1.5 0.8 - 5.3 10e3/uL    Absolute Monocytes 0.4 0.0 - 1.3 10e3/uL    Absolute Eosinophils 0.0 0.0 - 0.7 10e3/uL    Absolute Basophils 0.0 0.0 - 0.2 10e3/uL    Absolute Immature Granulocytes 0.0 <=0.4 10e3/uL    Absolute NRBCs 0.0 10e3/uL   Extra Blue Top Tube   Result Value Ref Range    Hold Specimen [facility]    Drug abuse screen 77 urine (FL, RH, SH)   Result Value Ref Range    Amphetamines Urine Screen Positive (A) Screen Negative    Barbituates Urine Screen Negative Screen Negative    Benzodiazepine Urine Screen Negative Screen Negative    Cannabinoids Urine Screen Negative Screen Negative    Opiates Urine Screen Negative Screen Negative    PCP Urine Screen Negative Screen Negative    Cocaine Urine Screen Negative Screen Negative       RADIOLOGY:  Reviewed all pertinent imaging. Please see official radiology report.  XR Chest Port 1 View   Final Result   IMPRESSION:  No acute cardiopulmonary disease.        IZZY CHURCH MD            SYSTEM ID:  [id number]          [id number] EKG available for comparison. EKG reviewed at 1227.  1) Rhythm: NSR  2) Rate: ventricular rate 69 bpm.  3) QRS Axis: No axis deviation  4) P waves/ ME interval: Sinus. Nml CRISTINA. No atrial enlargement  5) QRS complex: Narrow. No BBB. No ventricular hypertrophy. No pathological Q waves.  6) ST Segment: No acute ST segment elevation or depression  7) T waves: No T wave inversions. No  peaked or flattened T waves.     I have independently reviewed and interpreted today's EKG, pending cardiologist over read.         I, Darrion Dash PA-C, personally performed the services described in this documentation, and it is both accurate and complete.     Joel Dash PA-C  02/03/23 [id number]

## 2023-02-03 NOTE — PROGRESS NOTES
Patient reports that he takes suboxone, gabapentin, and adderal mutliple times a day. He did not tell writer doses of these medications.
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
## 2023-02-03 NOTE — PROGRESS NOTES
:    Patient presents to the ED with some possible mental health concerns.    DEC assessment at 1510.    NAOMI Howell on 2/3/2023 at 3:46 PM

## 2023-02-04 NOTE — PROGRESS NOTES
Patient reporting feeling better denies any anxiety or suicidal ideations. Patient was cleared by DEC to return home. Covered AVS with patient. He understood prescribed medication. Writer covered patients safe plan of care with him. Patient denied any questions and was able to teach back to writer on how to get help if he needs it.

## 2023-02-04 NOTE — PLAN OF CARE
Ant Tejeda  February 3, 2023  Plan of Care Hand-off Note     Patient Care Path: Discharge    Plan for Care:   Pt is a 27 year old male with hx of depression and substance abuse.  Pt self presents to ED with complaints of auditory hallucinations and panic attack.  Pt received medication in ED. Pt has no providers at this time.  Pt endorses passive suicidal ideation but denies specific thoughts, plan or intent.  Pt reports feeling safe and lives with significant other with support from parent.  Pt appears to be functioning at his baseline and does not appear to be an imminent threat to self or others at this time.  Pt endorses recent drug use and tests positive for amphetamines.  Pt declines detox and Rule 25 assessment.    Pt will be discharged with [facility] following up to offer outpatient services assistance.  Pt provided other mental health resource to contact for CD assessment, therapy and medication evaluation.    Critical Safety Issues: Pt has hx of psychiatric hospitalization and suicidal ideation.  Chart hx indicates past suicide attempts 2+ years ago.  Pt denies current SI or HI.  Pt continues to abuse substances.    Overview:  This patient is a child/adolescent: No    This patient has additional special visitor precautions: No    Legal Status: Voluntary    Contacts:   mother Mike Casanova, [phone]: Contact      Psychiatry Consult:  Psychiatry Consult not requested because ER MD prescribed medication    Updated Attending Provider regarding plan of care.    Marlen Mullins

## 2023-02-05 LAB
ATRIAL RATE - MUSE: 69 BPM
DIASTOLIC BLOOD PRESSURE - MUSE: NORMAL MMHG
INTERPRETATION ECG - MUSE: NORMAL
P AXIS - MUSE: -5 DEGREES
PR INTERVAL - MUSE: 160 MS
QRS DURATION - MUSE: 96 MS
QT - MUSE: 410 MS
QTC - MUSE: 439 MS
R AXIS - MUSE: 38 DEGREES
SYSTOLIC BLOOD PRESSURE - MUSE: NORMAL MMHG
T AXIS - MUSE: 25 DEGREES
VENTRICULAR RATE- MUSE: 69 BPM

## 2023-02-16 ENCOUNTER — HOSPITAL ENCOUNTER (EMERGENCY)
Facility: OTHER | Age: 28
Discharge: HOME | End: 2023-02-16
Attending: PHYSICIAN ASSISTANT | Admitting: PHYSICIAN ASSISTANT
Payer: COMMERCIAL

## 2023-02-16 VITALS
DIASTOLIC BLOOD PRESSURE: 87 MMHG | TEMPERATURE: 98.1 F | WEIGHT: 315 LBS | BODY MASS INDEX: 36.45 KG/M2 | OXYGEN SATURATION: 97 % | SYSTOLIC BLOOD PRESSURE: 216 MMHG | HEART RATE: 94 BPM | RESPIRATION RATE: 16 BRPM | HEIGHT: 78 IN

## 2023-02-16 DIAGNOSIS — E16.2 HYPOGLYCEMIA: ICD-10-CM

## 2023-02-16 LAB — GLUCOSE BLDC GLUCOMTR-MCNC: 187 MG/DL (ref 70–99)

## 2023-02-16 PROCEDURE — 99283 EMERGENCY DEPT VISIT LOW MDM: CPT | Performed by: PHYSICIAN ASSISTANT

## 2023-02-16 PROCEDURE — 82962 GLUCOSE BLOOD TEST: CPT

## 2023-02-17 NOTE — ED TRIAGE NOTES
Pt here by himself with c/o low blood sugars throughout the day, pt has a meter and has concerns, pt recently started on glucophage, pt brought back into ER to be evaluated     Triage Assessment     Row Name 02/16/23 5426       Triage Assessment (Adult)    Airway WDL WDL       Respiratory WDL    Respiratory WDL WDL       Skin Circulation/Temperature WDL    Skin Circulation/Temperature WDL WDL       Cardiac WDL    Cardiac WDL WDL       Peripheral/Neurovascular WDL    Peripheral Neurovascular WDL WDL       Cognitive/Neuro/Behavioral WDL    Cognitive/Neuro/Behavioral WDL WDL

## 2023-02-17 NOTE — ED PROVIDER NOTES
Chief Complaint:  Hypoglycemia       HPI   Ant Tejeda is a 27 year old male who presents with episodes of hypoglycemia.  Patient is a type II diabetic diet controlled he is not on any antidiabetics on a regular basis and he is not on insulin.  He noticed that over the last couple days since starting metformin that was started out of the emergency department he has had hypoglycemia.  He has not followed up with his primary care provider in regards to the metformin that he was placed on.  His last hemoglobin A1c was 7.1.  The patient is alert and oriented he answers questions appropriately he does have a monitor with him and his average sugar is 95.  He does not have any headaches and his numbers disturbances runny nose nasal congestion sinus pressure sore throat or cough no neck pain chest pain shortness of breath no abdominal pain the patient is here for evaluation of hypoglycemia since starting metformin    Independent Historian: Patient provides history    Review of External Notes: I did review his medical record    ROS:  Please see HPI for pertinent positives and negatives.  All other systems reviewed and found to be negative.     Allergies:  No Known Allergies     Medications:    gabapentin (NEURONTIN) 300 MG capsule  hydrOXYzine (VISTARIL) 25 MG capsule  metFORMIN (GLUCOPHAGE) 500 MG tablet  sertraline (ZOLOFT) 50 MG tablet  terbinafine (LAMISIL) 1 % external cream        Past Medical History:    No past medical history on file.    Past Surgical History:    No past surgical history on file.     Family History:    family history is not on file.    Social History:   reports that he has been smoking cigarettes. He has a 1.25 pack-year smoking history. He has never used smokeless tobacco. He reports that he does not currently use alcohol. He reports current drug use. Frequency: 70.00 times per week. Drug: Opiates.  PCP: Aung Cornell     Physical Exam     Patient Vitals for the past 24 hrs:   BP Temp Temp src  "Pulse Resp SpO2 Height Weight   02/16/23 2155 (!) 216/87 -- -- -- -- -- -- --   02/16/23 2152 -- 98.1  F (36.7  C) Tympanic 94 16 97 % 1.981 m (6' 6\") (!) 186 kg (410 lb)        Vitals:    02/16/23 2152 02/16/23 2155   BP:  (!) 216/87   Pulse: 94    Resp: 16    Temp: 98.1  F (36.7  C)    TempSrc: Tympanic    SpO2: 97%    Weight: (!) 186 kg (410 lb)    Height: 1.981 m (6' 6\")        Physical Exam  Exam:  Constitutional: healthy, alert and no distress  Head: Normocephalic. No masses, lesions, tenderness or abnormalities  Neck: Neck supple. No adenopathy. Thyroid symmetric, normal size,, Carotids without bruits.  ENT: ENT exam normal, no neck nodes or sinus tenderness  Cardiovascular: negative, PMI normal. No lifts, heaves, or thrills. RRR. No murmurs, clicks gallops or rub  Respiratory: negative, Percussion normal. Good diaphragmatic excursion. Lungs clear  Gastrointestinal: Abdomen soft, non-tender. BS normal. No masses, organomegaly  : Deferred  Musculoskeletal: extremities normal- no gross deformities noted, gait normal and normal muscle tone  Skin: no suspicious lesions or rashes  Neurologic: Gait normal. Reflexes normal and symmetric. Sensation grossly WNL.  Psychiatric: mentation appears normal and affect normal/bright  Hematologic/Lymphatic/Immunologic: Normal cervical lymph nodes      Emergency Department Course         Laboratory:  Labs Ordered and Resulted from Time of ED Arrival to Time of ED Departure   GLUCOSE BY METER - Abnormal       Result Value    GLUCOSE BY METER POCT 187 (*)              Emergency Department Course & Assessments:      Interventions:  Medications - No data to display     Orders Place This Encounter:  Orders Placed This Encounter   Procedures     Glucose by meter     Glucose monitor nursing POCT       Independent Interpretation (X-rays, CTs, rhythm strip):  Patient did have bedside glucometer here today was 187 that was after eating and drinking patient was having a pop at the " bedside.    Consultations/Discussion of Management or Tests:  None    Social Determinants of Health affecting care:    Social Determinants of Health     Tobacco Use: High Risk     Smoking Tobacco Use: Every Day     Smokeless Tobacco Use: Never     Passive Exposure: Not on file   Alcohol Use: Not on file   Financial Resource Strain: Not on file   Food Insecurity: Not on file   Transportation Needs: Not on file   Physical Activity: Not on file   Stress: Not on file   Social Connections: Not on file   Intimate Partner Violence: Not on file   Depression: Not at risk     PHQ-2 Score: 0   Housing Stability: Not on file       At this time the patient does not have any concerns for food security, transportation, healthcare access and the patient currently lives at home.      Disposition:  The patient was discharged to home.     Impression & Plan    CMS Diagnoses:       Medical Decision Making:    RN & Ancillary Notes:    I have reviewed nursing & ancillary notes, and agree with protocol as initiated.      Lorena gentleman who presents emergency department for evaluation of the above concerns.  His bedside glucometer is 187.  He is tolerating oral hydration in fact he is drinking pop right now at the bedside contributing to his blood sugar of 187 but his glucometer shows an average over the last week of 95.  The patient checks his sugar every 4 hours.    He was found to have multiple sugars in the 40s.  I would encourage him close follow-up with his primary care provider about the metformin he was placed on.  If he is having significant sugars in the 40s he probably should hold his metformin that was recently prescribed him so he does not have any hypoglycemic events and again his hemoglobin A1c was above 7.1.    Diagnosis:    ICD-10-CM    1. Hypoglycemia  E16.2            Discharge Medications:  New Prescriptions    No medications on file        2/16/2023   Aung Oscar P, PA-C  MPAS, CAQ-EM       Aung Oscar  LEEANNE HAHN  02/16/23 6902

## 2023-03-15 DIAGNOSIS — F32.1 CURRENT MODERATE EPISODE OF MAJOR DEPRESSIVE DISORDER WITHOUT PRIOR EPISODE (H): Primary | ICD-10-CM

## 2023-03-16 NOTE — TELEPHONE ENCOUNTER
[id number] of [place] sent Rx request for the following:      Requested Prescriptions   Pending Prescriptions Disp Refills     gabapentin (NEURONTIN) 300 MG capsule 30 capsule 2     Sig: Take 1 capsule (300 mg) by mouth 3 times daily   Last Prescription Date:   2/3/23   Last Fill Qty/Refills:         30, R-2 (Ordered in ED)         sertraline (ZOLOFT) 50 MG tablet 30 tablet 0     Sig: Take 1 tab daily.  If Zoloft makes you sleepy, take it in the evening.  If Zoloft makes you feel more awake, take in the morning.   Last Prescription Date:   2/3/23  Last Fill Qty/Refills:         30, R-0  (Ordered in ED)    SSRIs Protocol Failed - 3/16/2023  4:14 PM        Failed - Recent (12 mo) or future (30 days) visit within the authorizing provider's specialty     Last Office Visit:              5/8/21   Future Office visit:             Next 5 appointments (look out 90 days)    Mar 20, 2023  3:40 PM  SHORT with Maddi Carnes PA-C  [facility] ([facility] ) [address]  [place] [id number] [phone]        Provider to associate diagnosis.    Maria E Matthews RN .............. 3/16/2023  4:15 PM    
negative

## 2023-03-17 RX ORDER — GABAPENTIN 300 MG/1
300 CAPSULE ORAL 3 TIMES DAILY
Qty: 30 CAPSULE | Refills: 0 | Status: SHIPPED | OUTPATIENT
Start: 2023-03-17

## 2023-05-04 NOTE — ED PROVIDER NOTES
History     Chief Complaint   Patient presents with     Dizziness     HPI  Ant Tejeda is a 26 year old male  With type ii DM, morbid obesity and major depressive disorder who presents to the ER with dizziness and chest pressure.  Patient states he got up at 6 AM this morning went to work around 11 in try to start eating something.  He stated that he started feeling lightheaded and dizzy and had chest pressure.  Patient has been started on insulin as noted in his hospital course below and has been taking it but states he only checks his blood sugar about once a day.  He states his runs in the 140s.  He notes that he really only eats once or twice a day most days.  He admits he has not eaten anything at all today and also has not taken any insulin.  The last insulin he took was long-acting insulin at 10:00 last night.  He has not had any fever or chills no shortness of breath.  Denies any nausea vomiting or diarrhea.  Has been feeling more constipated.  Of note patient has been taking up to 10 Percocet daily that he has gotten from an unknown source-this medication is not on his med list in his chart.  He stated that he initially was only taking the Percocet for some tingling in his legs but now he takes it to get high.      Hospital Course 2/6-2/7/22    Mr. Ant Tejeda is a 26 y.o. male with a history of DM2 on metformin, obesity class 3, and MDD who presents with nausea and elevated blood sugars.     Patient reports that his blood sugar has increased over the past week with BG readings >500. Developed nausea, vomiting, polydipsia and polyuria. Labs on admission notable for beta hydroxybutyrate is 2.6. PH normal at 7.34, bicarb wnl at 21, and normal anion gap of 17. WBC wnl. LFTs notable for ALT 70 otherwise wnl. UA with ketones and glucosuria. Hgb A1C 13.1%. Given 2 L NS and started on insulin gtt. No indication of DKA with normal bicarb and normal AG gap. Suspect elevated BHB is due to starvation ketosis  PATIENT NAME: Ray Goldstein  MRN: [id number]   DATE OF PROCEDURE: 05/04/23     TITLE OF PROCEDURE:  1) Fluoroscopy Guided Sacroiliac Joint Injection     SIDE: Bilateral    PREOPERATIVE DIAGNOSES: Sacroiliac Joint Dysfunction    POSTOPERATIVE DIAGNOSES:  Same    LOCATION: [facility]    SURGEON: Evaristo Clark MD    ASSISTANTS: None    ANESTHESIA:  Local Only    DESCRIPTION OF OPERATIVE PROCEDURE:  After discussing the risks, benefits and alternatives, an informed consent was obtained. An IV was not placed. The patient was brought to the procedure room and was positioned prone for the procedure. Time-out was conducted with all members of the care team. Standard ASA monitors were placed. Standard prep and drape were performed. Sterile technique was used throughout.     A/P fluoroscopic views were obtained of the right sacroiliac joint. The area overlying the joint was identified with a contralateral oblique tilt (0-5 degrees left) and marked with a sterile marking pen. Next, a 25-gauge 1.5 inch hypodermic needle was used to anesthetize the skin and superficial tissues with 4 ml of 1% lidocaine. Then a 22-gauge, 3.5\" straight spinal needle was guided into the inferior portion of the sacroiliac joint under fluoroscopic guidance. The needle was felt penetrating the joint capsule and 0.25 ml of Isovue-200 contrast dye was injected. Good dye spread was noted in AP projections without evidence of intraneural, intrathecal, or intravascular injection. After a negative aspiration, a mixture of 2mL of 1% lidocaine and 20mg (0.5ml) of DepoMedrol was incrementally injected into the joint space, and the needle was then flushed with normal saline and removed (total volume 2.5ml). The procedure was then repeated on the opposite side.  Initial L sided spread was extraarticular.  Repositioned needle with excellent intraarticular spread.    The surgical site preparation was washed off of the patient. Band-Aids were applied. The patient  "secondary to nausea and vomiting. Pt was transitioned to SQ insulin HD1. He was discharged on a regimen on 50 units basal insulin and 16 units prandial insulin TID with meals, as well as medium dose sliding scale. He should have close follow up with his PCP in [place] to discuss further titration of insulin and follow up with a diabetes educator in 2-4 weeks.        Allergies:  No Known Allergies    Problem List:    Patient Active Problem List    Diagnosis Date Noted     Morbid obesity (H) 05/28/2021     Priority: Medium     Major depressive disorder 08/26/2020     Priority: Medium        Past Medical History:    History reviewed. No pertinent past medical history.    Past Surgical History:    History reviewed. No pertinent surgical history.    Family History:    History reviewed. No pertinent family history.    Social History:  Marital Status:  Single [1]  Social History     Tobacco Use     Smoking status: Current Every Day Smoker     Packs/day: 0.25     Years: 5.00     Pack years: 1.25     Types: Cigarettes     Smokeless tobacco: Never Used     Tobacco comment: Pt denies want of counseling    Substance Use Topics     Alcohol use: Not Currently     Comment: once a month     Drug use: Yes     Frequency: 70.0 times per week     Types: Opiates     Comment: less than 10 a day        Medications:    metFORMIN (GLUCOPHAGE) 500 MG tablet      Review of Systems   Constitutional: Negative.    Eyes: Negative.    Respiratory: Positive for chest tightness.    Cardiovascular: Positive for chest pain. Negative for palpitations.   Gastrointestinal: Positive for constipation. Negative for diarrhea and nausea.   Genitourinary: Negative.    Musculoskeletal: Negative for back pain.   Psychiatric/Behavioral: Positive for decreased concentration and suicidal ideas. The patient is nervous/anxious.        Physical Exam   BP: (!) 142/81  Pulse: 90  Temp: 97.6  F (36.4  C)  Resp: 17  Height: 195.6 cm (6' 5\")  Weight: (!) 186.4 kg (410 " was brought to the recovery area.  The patient did very well and the procedure results were discussed.      COMPLICATIONS: None    EBL: minimal    URINE OUTPUT: not monitored    FLUIDS: None    DISPO: Following the procedure, the patient was re-evaluated and was stable for discharge. Standard discharge instructions were provided to the patient. The patient knows how to contact the clinic should they have any questions or concerns.    PLAN: Follow up in clinic in 2-4 weeks to assess response to injection.     Evaristo Clark MD  Anesthesiology/Pain Management  [facility]  05/04/23 2:20 PM                lb 15 oz)  SpO2: 98 %      Physical Exam  Vitals and nursing note reviewed.   Constitutional:       Appearance: Normal appearance. He is obese.   HENT:      Head: Normocephalic and atraumatic.   Eyes:      Extraocular Movements: Extraocular movements intact.      Conjunctiva/sclera: Conjunctivae normal.      Pupils: Pupils are equal, round, and reactive to light.   Cardiovascular:      Rate and Rhythm: Normal rate and regular rhythm.      Heart sounds: Normal heart sounds.   Pulmonary:      Breath sounds: Normal breath sounds.   Abdominal:      General: Abdomen is flat. Bowel sounds are normal.      Palpations: Abdomen is soft.   Musculoskeletal:         General: Normal range of motion.   Skin:     General: Skin is warm and dry.      Capillary Refill: Capillary refill takes less than 2 seconds.   Neurological:      Mental Status: He is alert.       ED Course   Patient seen and examined.  Labs ordered.  EKG ordered and no acute changes are appreciated.  Chart reviewed.    Discussed with patient is present concerns.  Patient admits to being depressed and at times does not want to wake up when he goes to sleep, but then also feels scared when he goes to sleep that he will not wake up.  He has never made any plans to hurt himself but admitted that he has been very tearful ever since he had Covid in January.  He was hospitalized and ended up with insulin-dependent diabetes after that and also was shot at some point in the last 4 months.  He notes that he is not sleeping well at night.  He ate less than half a sandwich prior to coming to the emergency room and had just a few of the chips and less than half a sandwich here in the emergency room as well.  He stated he just was not hungry.    Patient lives with his mother.  He does feel safe going home but admits that he feels very sad and does not know what to do with his life at this point.  He has been taking higher amounts of Percocet that he is getting from an unknown  source.  He notes that he feels scattered at times.  While we are having this conversation he broke down and started crying stating that he often is very scared and does not know what is going to happen next.  He admits at times he cannot concentrate and he is worried about losing his job because of it.  He also does not want his employer to find out about his drug use.  Patient is very distressed when speaking about the subjects above.    We will have patient assessed by mental health for evaluation and also potentially for contacts within the community that he could use for mental health treatment.  He was comfortable with that plan.  He also will likely need detoxification from his Percocet.  He is afraid to go to detox as he is afraid he will get in trouble at his work if he does that.  We will wait for mental health assessment and then decide from there if we need to deal with detoxification more.    Discussed patient with domenica.  They feel comfortable that he is safe to go home and I agree with that assessment.  I do think he needs individual mental health counseling and he is willing to do that so they agreed to try to help to set that up.  He will be given resources to use in the community.  We will try to get him into see Dr. Andino in follow-up for his diabetes as well as for probable detoxification from the Percocet that he is taking at present.  He is interested in doing that.  I suspect he is using the Percocet to help with his anxiety as his anxiety has been quite high.  He is agreeable with that assessment as well.  I did not start him any new medications as it prefer this to be done by his primary.  Patient does not have a primary [place] that he is seen regularly so I recommended him to Dr. Andino and hopefully they will be able to get him in.    Will have patient walk and make sure he is able to ambulate without difficulty.   Will discharge home.   Mental Health Risk Assessment      PSS-3     Date and Time Over the past 2 weeks have you felt down, depressed, or hopeless? Over the past 2 weeks have you had thoughts of killing yourself? Have you ever attempted to kill yourself? When did this last happen? User   04/09/22 1521 yes yes no -- JOSE R      C-SSRS ([place])    Date and Time Q1 Wished to be Dead (Past Month) Q2 Suicidal Thoughts (Past Month) Q3 Suicidal Thought Method Q4 Suicidal Intent without Specific Plan Q5 Suicide Intent with Specific Plan Q6 Suicide Behavior (Lifetime) Within the Past 3 Months? RETIRED: Level of Risk per Screen Screening Not Complete User   04/09/22 1521 yes yes no no no yes -- -- -- JOSE R               Suicide assessment completed by mental health (D.E.C., LCSW, etc.)         EKG Interpretation:      Interpreted by Kristin Fernandez MD  Time reviewed: 16:15  Symptoms at time of EKG: chest pressure   Rhythm: normal sinus   Rate: normal  Axis: normal  Ectopy: none  Conduction: normal  ST Segments/ T Waves: No ST-T wave changes  Q Waves: none  Comparison to prior: Unchanged from 2020    Clinical Impression: normal EKG      Results for orders placed or performed during the hospital encounter of 04/09/22 (from the past 24 hour(s))   Glucose by meter   Result Value Ref Range    GLUCOSE BY METER POCT 156 (H) 70 - 99 mg/dL   CBC with platelets differential    Narrative    The following orders were created for panel order CBC with platelets differential.  Procedure                               Abnormality         Status                     ---------                               -----------         ------                     CBC with platelets and d...[[id number]]                      Final result                 Please view results for these tests on the individual orders.   Basic metabolic panel   Result Value Ref Range    Sodium 137 134 - 144 mmol/L    Potassium 3.7 3.5 - 5.1 mmol/L    Chloride 103 98 - 107 mmol/L    Carbon Dioxide (CO2) 26 21 - 31 mmol/L    Anion Gap 8 3 - 14 mmol/L     Urea Nitrogen 9 7 - 25 mg/dL    Creatinine 0.62 (L) 0.70 - 1.30 mg/dL    Calcium 9.6 8.6 - 10.3 mg/dL    Glucose 132 (H) 70 - 105 mg/dL    GFR Estimate >90 >60 mL/min/1.73m2   Ketone Beta-Hydroxybutyrate Quantitative   Result Value Ref Range    Ketone (Beta-Hydroxybutyrate) Quantitative 0.1 0.0 - 0.6 mmol/L   Blood gas venous and oxyhgb   Result Value Ref Range    pH Venous 7.37 7.32 - 7.43    pCO2 Venous 47 40 - 50 mm Hg    pO2 Venous 37 25 - 47 mm Hg    Bicarbonate Venous 27 21 - 28 mmol/L    FIO2 0     Oxyhemoglobin Venous 69 (L) 70 - 75 %    Base Excess/Deficit (+/-) 1.1 -7.7 - 1.9 mmol/L   CBC with platelets and differential   Result Value Ref Range    WBC Count 8.9 4.0 - 11.0 10e3/uL    RBC Count 5.14 4.40 - 5.90 10e6/uL    Hemoglobin 14.5 13.3 - 17.7 g/dL    Hematocrit 44.1 40.0 - 53.0 %    MCV 86 78 - 100 fL    MCH 28.2 26.5 - 33.0 pg    MCHC 32.9 31.5 - 36.5 g/dL    RDW 13.1 10.0 - 15.0 %    Platelet Count 398 150 - 450 10e3/uL    % Neutrophils 74 %    % Lymphocytes 19 %    % Monocytes 6 %    % Eosinophils 1 %    % Basophils 0 %    % Immature Granulocytes 0 %    NRBCs per 100 WBC 0 <1 /100    Absolute Neutrophils 6.5 1.6 - 8.3 10e3/uL    Absolute Lymphocytes 1.7 0.8 - 5.3 10e3/uL    Absolute Monocytes 0.5 0.0 - 1.3 10e3/uL    Absolute Eosinophils 0.1 0.0 - 0.7 10e3/uL    Absolute Basophils 0.0 0.0 - 0.2 10e3/uL    Absolute Immature Granulocytes 0.0 <=0.4 10e3/uL    Absolute NRBCs 0.0 10e3/uL   Troponin I (now)   Result Value Ref Range    Troponin I 2.4 0.0 - 34.0 pg/mL       Medications   0.9% sodium chloride BOLUS (0 mLs Intravenous Stopped 4/9/22 6444)       Assessments & Plan (with Medical Decision Making)     I have reviewed the nursing notes.    I have reviewed the findings, diagnosis, plan and need for follow up with the patient.    New Prescriptions    No medications on file       Final diagnoses:   Moderate episode of recurrent major depressive disorder (H)   Anxiety   Dizziness   Type 2 diabetes  mellitus without complication, without long-term current use of insulin (H)       4/9/2022   [facility] IslandKristin MD  04/09/22 6782

## 2023-08-18 ENCOUNTER — APPOINTMENT (OUTPATIENT)
Dept: GENERAL RADIOLOGY | Facility: OTHER | Age: 28
End: 2023-08-18
Attending: PHYSICIAN ASSISTANT
Payer: COMMERCIAL

## 2023-08-18 ENCOUNTER — HOSPITAL ENCOUNTER (EMERGENCY)
Facility: OTHER | Age: 28
Discharge: HOME | End: 2023-08-18
Attending: PHYSICIAN ASSISTANT | Admitting: PHYSICIAN ASSISTANT
Payer: COMMERCIAL

## 2023-08-18 ENCOUNTER — APPOINTMENT (OUTPATIENT)
Dept: CT IMAGING | Facility: OTHER | Age: 28
End: 2023-08-18
Attending: PHYSICIAN ASSISTANT
Payer: COMMERCIAL

## 2023-08-18 VITALS
WEIGHT: 315 LBS | DIASTOLIC BLOOD PRESSURE: 77 MMHG | TEMPERATURE: 97.1 F | RESPIRATION RATE: 16 BRPM | BODY MASS INDEX: 36.45 KG/M2 | SYSTOLIC BLOOD PRESSURE: 127 MMHG | OXYGEN SATURATION: 99 % | HEART RATE: 74 BPM | HEIGHT: 78 IN
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
## 2023-08-18 DIAGNOSIS — R10.9 RIGHT SIDED ABDOMINAL PAIN: ICD-10-CM

## 2023-08-18 DIAGNOSIS — S20.211A CONTUSION OF RIGHT CHEST WALL, INITIAL ENCOUNTER: ICD-10-CM

## 2023-08-18 DIAGNOSIS — S50.312A ABRASION OF LEFT ELBOW, INITIAL ENCOUNTER: ICD-10-CM

## 2023-08-18 LAB
ALBUMIN SERPL BCG-MCNC: 4.2 G/DL (ref 3.5–5.2)
ALP SERPL-CCNC: 90 U/L (ref 40–129)
ALT SERPL W P-5'-P-CCNC: 32 U/L (ref 0–70)
ANION GAP SERPL CALCULATED.3IONS-SCNC: 10 MMOL/L (ref 7–15)
AST SERPL W P-5'-P-CCNC: 28 U/L (ref 0–45)
BASOPHILS # BLD AUTO: 0 10E3/UL (ref 0–0.2)
BASOPHILS NFR BLD AUTO: 0 %
BILIRUB SERPL-MCNC: 0.5 MG/DL
BUN SERPL-MCNC: 7.9 MG/DL (ref 6–20)
CALCIUM SERPL-MCNC: 9.4 MG/DL (ref 8.6–10)
CHLORIDE SERPL-SCNC: 105 MMOL/L (ref 98–107)
CREAT SERPL-MCNC: 0.69 MG/DL (ref 0.67–1.17)
DEPRECATED HCO3 PLAS-SCNC: 23 MMOL/L (ref 22–29)
EOSINOPHIL # BLD AUTO: 0.1 10E3/UL (ref 0–0.7)
EOSINOPHIL NFR BLD AUTO: 1 %
ERYTHROCYTE [DISTWIDTH] IN BLOOD BY AUTOMATED COUNT: 13.1 % (ref 10–15)
GFR SERPL CREATININE-BSD FRML MDRD: >90 ML/MIN/1.73M2
GLUCOSE SERPL-MCNC: 123 MG/DL (ref 70–99)
HCT VFR BLD AUTO: 42.1 % (ref 40–53)
HGB BLD-MCNC: 14.2 G/DL (ref 13.3–17.7)
HOLD SPECIMEN: NORMAL
IMM GRANULOCYTES # BLD: 0 10E3/UL
IMM GRANULOCYTES NFR BLD: 0 %
LYMPHOCYTES # BLD AUTO: 1.7 10E3/UL (ref 0.8–5.3)
LYMPHOCYTES NFR BLD AUTO: 18 %
MCH RBC QN AUTO: 29.6 PG (ref 26.5–33)
MCHC RBC AUTO-ENTMCNC: 33.7 G/DL (ref 31.5–36.5)
MCV RBC AUTO: 88 FL (ref 78–100)
MONOCYTES # BLD AUTO: 0.6 10E3/UL (ref 0–1.3)
MONOCYTES NFR BLD AUTO: 6 %
NEUTROPHILS # BLD AUTO: 6.9 10E3/UL (ref 1.6–8.3)
NEUTROPHILS NFR BLD AUTO: 75 %
NRBC # BLD AUTO: 0 10E3/UL
NRBC BLD AUTO-RTO: 0 /100
PLATELET # BLD AUTO: 374 10E3/UL (ref 150–450)
POTASSIUM SERPL-SCNC: 4 MMOL/L (ref 3.4–5.3)
PROT SERPL-MCNC: 7.3 G/DL (ref 6.4–8.3)
RBC # BLD AUTO: 4.79 10E6/UL (ref 4.4–5.9)
SODIUM SERPL-SCNC: 138 MMOL/L (ref 136–145)
WBC # BLD AUTO: 9.3 10E3/UL (ref 4–11)

## 2023-08-18 PROCEDURE — 80053 COMPREHEN METABOLIC PANEL: CPT | Performed by: PHYSICIAN ASSISTANT

## 2023-08-18 PROCEDURE — 36415 COLL VENOUS BLD VENIPUNCTURE: CPT | Performed by: PHYSICIAN ASSISTANT

## 2023-08-18 PROCEDURE — 85041 AUTOMATED RBC COUNT: CPT | Performed by: PHYSICIAN ASSISTANT

## 2023-08-18 PROCEDURE — 250N000011 HC RX IP 250 OP 636: Performed by: PHYSICIAN ASSISTANT

## 2023-08-18 PROCEDURE — 85014 HEMATOCRIT: CPT | Performed by: PHYSICIAN ASSISTANT

## 2023-08-18 PROCEDURE — 71045 X-RAY EXAM CHEST 1 VIEW: CPT

## 2023-08-18 PROCEDURE — 74177 CT ABD & PELVIS W/CONTRAST: CPT

## 2023-08-18 PROCEDURE — 96374 THER/PROPH/DIAG INJ IV PUSH: CPT | Mod: XU

## 2023-08-18 PROCEDURE — 96375 TX/PRO/DX INJ NEW DRUG ADDON: CPT | Mod: XU

## 2023-08-18 PROCEDURE — 73080 X-RAY EXAM OF ELBOW: CPT | Mod: LT

## 2023-08-18 PROCEDURE — 250N000009 HC RX 250: Performed by: PHYSICIAN ASSISTANT

## 2023-08-18 PROCEDURE — 99284 EMERGENCY DEPT VISIT MOD MDM: CPT | Performed by: PHYSICIAN ASSISTANT

## 2023-08-18 PROCEDURE — 99285 EMERGENCY DEPT VISIT HI MDM: CPT | Mod: 25

## 2023-08-18 RX ORDER — KETOROLAC TROMETHAMINE 15 MG/ML
15 INJECTION, SOLUTION INTRAMUSCULAR; INTRAVENOUS ONCE
Status: COMPLETED | OUTPATIENT
Start: 2023-08-18 | End: 2023-08-18

## 2023-08-18 RX ORDER — IOPAMIDOL 755 MG/ML
150 INJECTION, SOLUTION INTRAVASCULAR ONCE
Status: COMPLETED | OUTPATIENT
Start: 2023-08-18 | End: 2023-08-18

## 2023-08-18 RX ORDER — OXYCODONE AND ACETAMINOPHEN 5; 325 MG/1; MG/1
1 TABLET ORAL EVERY 6 HOURS PRN
Qty: 12 TABLET | Refills: 0 | Status: SHIPPED | OUTPATIENT
Start: 2023-08-18

## 2023-08-18 RX ORDER — GINSENG 100 MG
500 CAPSULE ORAL ONCE
Status: COMPLETED | OUTPATIENT
Start: 2023-08-18 | End: 2023-08-18

## 2023-08-18 RX ORDER — IBUPROFEN 600 MG/1
600 TABLET, FILM COATED ORAL EVERY 6 HOURS PRN
Qty: 30 TABLET | Refills: 0 | Status: SHIPPED | OUTPATIENT
Start: 2023-08-18

## 2023-08-18 RX ORDER — IOPAMIDOL 755 MG/ML
150 INJECTION, SOLUTION INTRAVASCULAR ONCE
Status: CANCELLED | OUTPATIENT
Start: 2023-08-18 | End: 2023-08-18

## 2023-08-18 RX ADMIN — IOPAMIDOL 150 ML: 755 INJECTION, SOLUTION INTRAVENOUS at 15:29

## 2023-08-18 RX ADMIN — KETOROLAC TROMETHAMINE 15 MG: 15 INJECTION, SOLUTION INTRAMUSCULAR; INTRAVENOUS at 15:00

## 2023-08-18 RX ADMIN — BACITRACIN 1 G: 500 OINTMENT TOPICAL at 16:13

## 2023-08-18 RX ADMIN — HYDROMORPHONE HYDROCHLORIDE 1 MG: 1 INJECTION, SOLUTION INTRAMUSCULAR; INTRAVENOUS; SUBCUTANEOUS at 15:00

## 2023-08-18 ASSESSMENT — ACTIVITIES OF DAILY LIVING (ADL): ADLS_ACUITY_SCORE: 35
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
## 2023-08-18 NOTE — DISCHARGE INSTRUCTIONS
-No signs of acute fracture or injury on x-rays or CT studies.    -Expect to be more stiff and sore the next 2 to 3 days before improving.  Alternate ibuprofen 600 mg and Percocet 1 tab every 3 hours as needed for pain.  Once pain improves, you may discontinue Percocet and use Tylenol as placed.  Do not use Tylenol and Percocet together    -You may shower normally.  For your abrasions, place wash gently with soap and water, apply bacitracin/Neosporin, and then apply new dressing.    -Please return to the ER for any worsening of symptoms.  Otherwise follow with your primary care doctor next week for reevaluation.

## 2023-08-18 NOTE — PROGRESS NOTES
1.  Has the patient had a previous reaction to IV contrast? No    2.  Does the patient have kidney disease? No    3.  Is the patient on dialysis? No    If YES to any of these questions, exam will be reviewed with a Radiologist before administering contrast.   IV Contrast- Discharge Instructions After Your CT Scan      The IV contrast you received today will be filtered from your bloodstream by your kidneys during the next 24 hours and pass from the body in urine.  You will not be aware of this process and your urine will not change in color.  To help this process you should drink at least 4 additional glasses of water or juice today.  This reduces stress on your kidneys.    Most contrast reactions are immediate.  Should you develop symptoms of concern after discharge, contact the department at the number below.  After hours you should contact your personal physician.  If you develop breathing distress or wheezing, call 911.

## 2025-05-17 ENCOUNTER — HEALTH MAINTENANCE LETTER (OUTPATIENT)
Age: 30
End: 2025-05-17

## 2025-08-30 ENCOUNTER — HEALTH MAINTENANCE LETTER (OUTPATIENT)
Age: 30
End: 2025-08-30

## (undated) RX ORDER — KETOROLAC TROMETHAMINE 15 MG/ML
INJECTION, SOLUTION INTRAMUSCULAR; INTRAVENOUS
Status: DISPENSED
Start: 2022-07-28

## (undated) RX ORDER — SODIUM CHLORIDE 9 MG/ML
INJECTION, SOLUTION INTRAVENOUS
Status: DISPENSED
Start: 2022-02-03

## (undated) RX ORDER — LORAZEPAM 2 MG/ML
INJECTION INTRAMUSCULAR
Status: DISPENSED
Start: 2023-02-03

## (undated) RX ORDER — CEFTRIAXONE SODIUM 1 G
VIAL (EA) INJECTION
Status: DISPENSED
Start: 2022-07-28

## (undated) RX ORDER — GINSENG 100 MG
CAPSULE ORAL
Status: DISPENSED
Start: 2023-08-18

## (undated) RX ORDER — GABAPENTIN 300 MG/1
CAPSULE ORAL
Status: DISPENSED
Start: 2023-02-03

## (undated) RX ORDER — HYDROXYZINE PAMOATE 25 MG/1
CAPSULE ORAL
Status: DISPENSED
Start: 2022-04-04

## (undated) RX ORDER — SODIUM CHLORIDE 9 MG/ML
INJECTION, SOLUTION INTRAVENOUS
Status: DISPENSED
Start: 2022-04-09

## (undated) RX ORDER — KETOROLAC TROMETHAMINE 15 MG/ML
INJECTION, SOLUTION INTRAMUSCULAR; INTRAVENOUS
Status: DISPENSED
Start: 2023-08-18

## (undated) RX ORDER — SODIUM CHLORIDE 9 MG/ML
INJECTION, SOLUTION INTRAVENOUS
Status: DISPENSED
Start: 2020-10-19

## (undated) RX ORDER — LIDOCAINE HYDROCHLORIDE 10 MG/ML
INJECTION, SOLUTION EPIDURAL; INFILTRATION; INTRACAUDAL; PERINEURAL
Status: DISPENSED
Start: 2022-07-28

## (undated) RX ORDER — CYCLOBENZAPRINE HCL 10 MG
TABLET ORAL
Status: DISPENSED
Start: 2022-07-28

## (undated) RX ORDER — ONDANSETRON 2 MG/ML
INJECTION INTRAMUSCULAR; INTRAVENOUS
Status: DISPENSED
Start: 2020-10-19